# Patient Record
Sex: MALE | Race: WHITE | Employment: OTHER | URBAN - METROPOLITAN AREA
[De-identification: names, ages, dates, MRNs, and addresses within clinical notes are randomized per-mention and may not be internally consistent; named-entity substitution may affect disease eponyms.]

---

## 2018-05-31 ENCOUNTER — TELEPHONE (OUTPATIENT)
Dept: SCHEDULING | Facility: CLINIC | Age: 78
End: 2018-05-31

## 2018-05-31 NOTE — TELEPHONE ENCOUNTER
"New Pt Dr Rogers  PCP Verified  Cardiologist - Dr Juloi Huang - referring to Dr Rogers for 2nd opinion for valve repair surgery  No recent hospitalizations  No assistance needed  Pt's sister stated he \"had all kinds of tests and she will bring paperwork.\"   Insurance - Medicare #[id number]  [employer] #[id number]  Offered 7/10; first available - requesting to be seen sooner.  Pls call Karime to schedule  Thank you   "

## 2018-06-04 NOTE — TELEPHONE ENCOUNTER
Patient spouse was contacted and added to the schedule of Dr. ANDERSON for 06/14. NPP sent in the mail

## 2018-06-11 PROBLEM — Z99.2 END STAGE RENAL DISEASE ON DIALYSIS (CMS/HCC): Status: ACTIVE | Noted: 2018-06-11

## 2018-06-11 PROBLEM — I34.0 MITRAL REGURGITATION: Status: ACTIVE | Noted: 2018-06-11

## 2018-06-11 PROBLEM — E78.5 HYPERLIPIDEMIA: Status: ACTIVE | Noted: 2018-06-11

## 2018-06-11 PROBLEM — N18.6 END STAGE RENAL DISEASE ON DIALYSIS (CMS/HCC): Status: ACTIVE | Noted: 2018-06-11

## 2018-06-11 PROBLEM — Z94.0 HISTORY OF KIDNEY TRANSPLANT: Status: ACTIVE | Noted: 2018-06-11

## 2018-06-11 PROBLEM — G47.33 OSA (OBSTRUCTIVE SLEEP APNEA): Status: ACTIVE | Noted: 2018-06-11

## 2018-06-11 PROBLEM — I10 HTN (HYPERTENSION): Status: ACTIVE | Noted: 2018-06-11

## 2018-06-11 PROBLEM — I25.10 CORONARY ARTERY DISEASE INVOLVING NATIVE CORONARY ARTERY OF NATIVE HEART WITHOUT ANGINA PECTORIS: Status: ACTIVE | Noted: 2018-06-11

## 2018-06-11 PROBLEM — N18.5 CHRONIC RENAL FAILURE, STAGE 5 (CMS/HCC): Status: ACTIVE | Noted: 2018-06-11

## 2018-06-11 PROBLEM — Z99.2: Status: ACTIVE | Noted: 2018-06-11

## 2018-06-11 PROBLEM — Z95.810 BIVENTRICULAR IMPLANTABLE CARDIOVERTER-DEFIBRILLATOR (ICD) IN SITU: Status: ACTIVE | Noted: 2018-06-11

## 2018-06-11 PROBLEM — Z90.5 STATUS POST NEPHRECTOMY: Status: ACTIVE | Noted: 2018-06-11

## 2018-06-11 PROBLEM — Z98.61 HISTORY OF PERCUTANEOUS CORONARY INTERVENTION: Status: ACTIVE | Noted: 2018-06-11

## 2018-06-11 PROBLEM — I42.0 DILATED CARDIOMYOPATHY (CMS/HCC): Status: ACTIVE | Noted: 2018-06-11

## 2018-06-11 PROBLEM — Q61.3 POLYCYSTIC KIDNEY DISEASE: Status: ACTIVE | Noted: 2018-06-11

## 2018-06-11 PROBLEM — I44.7 LBBB (LEFT BUNDLE BRANCH BLOCK): Status: ACTIVE | Noted: 2018-06-11

## 2018-06-11 PROBLEM — Z90.49 HISTORY OF APPENDECTOMY: Status: ACTIVE | Noted: 2018-06-11

## 2018-06-14 ENCOUNTER — OFFICE VISIT (OUTPATIENT)
Dept: CARDIOTHORACIC SURGERY | Facility: CLINIC | Age: 78
End: 2018-06-14
Payer: MEDICARE

## 2018-06-14 VITALS
WEIGHT: 175 LBS | SYSTOLIC BLOOD PRESSURE: 132 MMHG | RESPIRATION RATE: 12 BRPM | HEIGHT: 69 IN | TEMPERATURE: 97.5 F | HEART RATE: 77 BPM | OXYGEN SATURATION: 98 % | DIASTOLIC BLOOD PRESSURE: 66 MMHG | BODY MASS INDEX: 25.92 KG/M2

## 2018-06-14 DIAGNOSIS — I34.0 MITRAL VALVE INSUFFICIENCY, UNSPECIFIED ETIOLOGY: Primary | ICD-10-CM

## 2018-06-14 DIAGNOSIS — I12.0 HYPERTENSIVE CHRONIC KIDNEY DISEASE WITH STAGE 5 CHRONIC KIDNEY DISEASE OR END STAGE RENAL DISEASE (CODE): ICD-10-CM

## 2018-06-14 PROCEDURE — 99214 OFFICE O/P EST MOD 30 MIN: CPT | Performed by: THORACIC SURGERY (CARDIOTHORACIC VASCULAR SURGERY)

## 2018-06-14 PROCEDURE — 200200 PR NO CHARGE: Performed by: INTERNAL MEDICINE

## 2018-06-14 RX ORDER — ASPIRIN 81 MG/1
81 TABLET ORAL DAILY
COMMUNITY

## 2018-06-14 RX ORDER — CARVEDILOL 12.5 MG/1
12.5 TABLET ORAL 2 TIMES DAILY WITH MEALS
COMMUNITY

## 2018-06-14 RX ORDER — MEMANTINE HYDROCHLORIDE 10 MG/1
10 TABLET ORAL 2 TIMES DAILY
COMMUNITY

## 2018-06-14 RX ORDER — MUPIROCIN 20 MG/G
1 OINTMENT TOPICAL 2 TIMES DAILY
Qty: 22 G | Refills: 0 | Status: SHIPPED | OUTPATIENT
Start: 2018-06-14 | End: 2018-06-19

## 2018-06-14 RX ORDER — SEVELAMER CARBONATE 800 MG/1
800 TABLET, FILM COATED ORAL
COMMUNITY

## 2018-06-14 RX ORDER — LOSARTAN POTASSIUM 25 MG/1
25 TABLET ORAL 2 TIMES DAILY
COMMUNITY
End: 2018-07-10 | Stop reason: HOSPADM

## 2018-06-14 RX ORDER — TAMSULOSIN HYDROCHLORIDE 0.4 MG/1
0.4 CAPSULE ORAL NIGHTLY
COMMUNITY
End: 2018-06-14

## 2018-06-14 RX ORDER — ATORVASTATIN CALCIUM 20 MG/1
20 TABLET, FILM COATED ORAL DAILY
COMMUNITY

## 2018-06-14 ASSESSMENT — ENCOUNTER SYMPTOMS
WEAKNESS: 0
FATIGUE: 1
MYALGIAS: 0
CONFUSION: 0
DIARRHEA: 0
WOUND: 0
CHEST TIGHTNESS: 1
RHINORRHEA: 1
DIZZINESS: 1
BRUISES/BLEEDS EASILY: 0
PALPITATIONS: 0
CONSTIPATION: 0
SHORTNESS OF BREATH: 1
LIGHT-HEADEDNESS: 0
AGITATION: 0
BACK PAIN: 1
ARTHRALGIAS: 1
ACTIVITY CHANGE: 1

## 2018-06-14 NOTE — PROGRESS NOTES
Cardiac Surgery    Reason for consultation: Mitral regurgitation    HPI  Patient is a 78 y.o. male seen today for surgical evaluation of his mitral regurgitation. He was evaluated by [facility] and they declined to do surgery. He speaks Senegalese only, he is accompanied by his sister who is assisting with communication. He has symptoms of SOB and dizzy with steps and any activity.   His symptoms are relieved with rest for approximately  2-3 minutes. His symptoms were first noted about a year ago and has been progressive worse since then. He has associated symptoms of edema. He rates his symptoms as moderate.  Pertinent negatives include lightheadedness, syncope, chest pain, palpitations, or fatigue. 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 He has not been hospitalized for CHF in the last year.     Medical History:   Past Medical History:   Diagnosis Date   • Biventricular implantable cardioverter-defibrillator (ICD) in situ 6/11/2018   • Chronic renal failure, stage 5 (CMS/[facility]) ([facility]) 6/11/2018   • Coronary artery disease involving native coronary artery of native heart without angina pectoris 6/11/2018    ALESSIA to LAD   • Dependence on hemodialysis (CMS/[facility]) ([facility]) 6/11/2018   • Dilated cardiomyopathy (CMS/[facility]) ([facility]) 6/11/2018   • End stage renal disease on dialysis (CMS/[facility]) ([facility]) 6/11/2018   • History of appendectomy 6/11/2018    With peritonitis   • History of kidney transplant 6/11/2018   • History of percutaneous coronary intervention 6/11/2018    ALESSIA to LAD   • HTN (hypertension) 6/11/2018   • Hyperlipidemia 6/11/2018   • LBBB (left bundle branch block) 6/11/2018   • Mitral regurgitation 6/11/2018   • LANNY (obstructive sleep apnea) 6/11/2018   • Polycystic kidney disease 6/11/2018   • Status post nephrectomy 6/11/2018    Bilateral       Surgical History:   Past Surgical History:   Procedure Laterality Date   • APPENDECTOMY     • CARDIAC CATHETERIZATION      w stent   • HERNIA  REPAIR     • KIDNEY TRANSPLANT  2010       Allergies: Patient has no known allergies.    Current Outpatient Prescriptions   Medication Sig Dispense Refill   • aspirin 81 mg enteric coated tablet Take 81 mg by mouth daily.     • atorvastatin (LIPITOR) 20 mg tablet Take 20 mg by mouth daily.     • B complex with C#20-folic acid 1 mg capsule Take by mouth daily.     • carvedilol (COREG) 12.5 mg tablet Take 12.5 mg by mouth 2 (two) times a day with meals.     • losartan (COZAAR) 25 mg tablet Take 25 mg by mouth 2 (two) times a day.     • memantine (NAMENDA) 10 mg tablet Take 10 mg by mouth 2 (two) times a day.     • sevelamer (RENVELA) 800 mg tablet Take 800 mg by mouth 3 (three) times a day with meals.     • mupirocin (BACTROBAN) 2 % ointment Apply 1 application topically 2 (two) times a day for 5 days. Nasal application, starting 5 days before surgery 22 g 0     No current facility-administered medications for this visit.        Social History:   Social History     Social History   • Marital status:      Spouse name: N/A   • Number of children: N/A   • Years of education: N/A     Social History Main Topics   • Smoking status: Never Smoker   • Smokeless tobacco: Never Used   • Alcohol use No   • Drug use: No   • Sexual activity: Not Asked     Other Topics Concern   • None     Social History Narrative   • None       Family History:   Family History   Problem Relation Age of Onset   • Heart disease Mother    • Heart attack Mother    • Polycystic kidney disease Mother    • Hypertension Mother    • Stroke Father    • Hypertension Father    • Polycystic kidney disease Sister    • Hypertension Sister    • Polycystic kidney disease Brother    • Hypertension Brother        Review of Systems   Constitutional: Positive for activity change and fatigue.   HENT: Positive for postnasal drip and rhinorrhea. Negative for congestion.    Eyes: Negative for visual disturbance.   Respiratory: Positive for chest tightness and  shortness of breath.         No orthopnea   Cardiovascular: Positive for leg swelling. Negative for chest pain and palpitations.   Gastrointestinal: Negative for constipation and diarrhea.        Gall stones, to be scheduled for endoscopy   Genitourinary:        Anuric   Musculoskeletal: Positive for arthralgias and back pain. Negative for myalgias.   Skin: Negative for rash and wound.   Neurological: Positive for dizziness. Negative for syncope, weakness and light-headedness.   Hematological: Does not bruise/bleed easily.   Psychiatric/Behavioral: Negative for agitation, behavioral problems and confusion.   All other systems reviewed and are negative.      Objective        Physical Exam   Constitutional: He is oriented to person, place, and time. He appears well-developed and well-nourished. No distress.   HENT:   Head: Atraumatic.   Eyes: Conjunctivae and EOM are normal. Pupils are equal, round, and reactive to light.   Neck: Normal range of motion. Neck supple.   Cardiovascular: Normal rate, regular rhythm and normal heart sounds.    Grade 2 murmur   Pulmonary/Chest: Effort normal and breath sounds normal. No respiratory distress. He has no rales.   Abdominal: Soft. Bowel sounds are normal.   Musculoskeletal: Normal range of motion. He exhibits edema.   Neurological: He is alert and oriented to person, place, and time.   Skin: Skin is warm and dry. Capillary refill takes 2 to 3 seconds.   Psychiatric: He has a normal mood and affect. His behavior is normal. Judgment and thought content normal.   Vitals reviewed.    Assessment/Plan   Assessment:  Mitral Insufficiency  Echo and Cath reviewed by Dr Garces. Severe mitral regurgitation. Echo revealed dilated LV with moderate to severe MR, posterior leaflet prolapse, anterior directed wide jet, and  EF 35%. Symptoms not medically managed with diuretics, he is on hemodyalysis.  [facility].  Cardiac Cath revealed patent LAD stent and non obstructive lesions. No chest  discomfort, no syncopal or presyncopal episodes. Patient tolerating medical therapy.  Plan:  STS risk for surgical intervention/repair is 13.8% and 14.7% for replacement.  Recommend a mitral clip based on his surgical risk and frailty.  Will need a CTA chest abdomen and pelvis, carotid ultrasound, dental clearance and preadmission testing prior to surgery. He will need to stop his Losartan 5 days prior to surgery.  I have discussed with the patient and the family the rationale for the procedure as well as possible alternatives.  We discussed potential risks and complications associated with this procedure.  The patient and family understand and agree to proceed.    Renal failure  He has ESRD and receives hemodialysis (MWF).   We may need to adjust dialysis in coordination with his nephrologist.     Asa Garces MD

## 2018-06-18 ENCOUNTER — TELEPHONE (OUTPATIENT)
Dept: SCHEDULING | Facility: CLINIC | Age: 78
End: 2018-06-18

## 2018-06-28 ENCOUNTER — TRANSCRIBE ORDERS (OUTPATIENT)
Dept: RADIOLOGY | Facility: HOSPITAL | Age: 78
End: 2018-06-28

## 2018-06-28 ENCOUNTER — HOSPITAL ENCOUNTER (OUTPATIENT)
Dept: CARDIOLOGY | Facility: HOSPITAL | Age: 78
Discharge: HOME | End: 2018-06-28
Attending: NURSE PRACTITIONER
Payer: MEDICARE

## 2018-06-28 ENCOUNTER — APPOINTMENT (OUTPATIENT)
Dept: PREADMISSION TESTING | Facility: HOSPITAL | Age: 78
End: 2018-06-28
Attending: THORACIC SURGERY (CARDIOTHORACIC VASCULAR SURGERY)
Payer: MEDICARE

## 2018-06-28 ENCOUNTER — HOSPITAL ENCOUNTER (OUTPATIENT)
Dept: RADIOLOGY | Facility: HOSPITAL | Age: 78
Discharge: HOME | End: 2018-06-28
Attending: NURSE PRACTITIONER
Payer: MEDICARE

## 2018-06-28 ENCOUNTER — TELEPHONE (OUTPATIENT)
Dept: SCHEDULING | Facility: CLINIC | Age: 78
End: 2018-06-28

## 2018-06-28 ENCOUNTER — ANESTHESIA EVENT (OUTPATIENT)
Dept: CARDIOLOGY | Facility: HOSPITAL | Age: 78
Setting detail: SURGERY ADMIT
DRG: 228 | End: 2018-06-28
Payer: MEDICARE

## 2018-06-28 ENCOUNTER — APPOINTMENT (OUTPATIENT)
Dept: LAB | Facility: HOSPITAL | Age: 78
End: 2018-06-28
Attending: NURSE PRACTITIONER
Payer: MEDICARE

## 2018-06-28 VITALS
HEART RATE: 82 BPM | DIASTOLIC BLOOD PRESSURE: 70 MMHG | BODY MASS INDEX: 25.17 KG/M2 | WEIGHT: 175.8 LBS | RESPIRATION RATE: 16 BRPM | SYSTOLIC BLOOD PRESSURE: 157 MMHG | TEMPERATURE: 97.2 F | HEIGHT: 70 IN

## 2018-06-28 DIAGNOSIS — I34.0 NONRHEUMATIC MITRAL VALVE INSUFFICIENCY: Primary | ICD-10-CM

## 2018-06-28 DIAGNOSIS — I12.0 HYPERTENSIVE CHRONIC KIDNEY DISEASE WITH STAGE 5 CHRONIC KIDNEY DISEASE OR END STAGE RENAL DISEASE (CODE): ICD-10-CM

## 2018-06-28 DIAGNOSIS — I34.0 MITRAL VALVE INSUFFICIENCY, UNSPECIFIED ETIOLOGY: ICD-10-CM

## 2018-06-28 DIAGNOSIS — I34.0 NONRHEUMATIC MITRAL VALVE INSUFFICIENCY: ICD-10-CM

## 2018-06-28 DIAGNOSIS — Z01.818 ENCOUNTER FOR PREADMISSION TESTING: Primary | ICD-10-CM

## 2018-06-28 LAB
ABO + RH BLD: NORMAL
ALBUMIN SERPL-MCNC: 3.3 G/DL (ref 3.4–5)
ALP SERPL-CCNC: 86 IU/L (ref 35–126)
ALT SERPL-CCNC: 19 IU/L (ref 16–63)
ANION GAP SERPL CALC-SCNC: 13 MEQ/L (ref 3–15)
ANISOCYTOSIS BLD QL SMEAR: ABNORMAL
APTT PPP: 39 SEC. (ref 23–35)
AST SERPL-CCNC: 22 IU/L (ref 15–41)
ATRIAL RATE: 82
BASOPHILS # BLD: 0.03 K/UL (ref 0.01–0.1)
BASOPHILS NFR BLD: 0.8 %
BILIRUB SERPL-MCNC: 1 MG/DL (ref 0.3–1.2)
BLD GP AB SCN SERPL QL: NEGATIVE
BLOOD BANK CMNT PATIENT-IMP: NORMAL
BNP SERPL-MCNC: 3935 PG/ML
BUN SERPL-MCNC: 29 MG/DL (ref 8–20)
CALCIUM SERPL-MCNC: 10 MG/DL (ref 8.9–10.3)
CHLORIDE SERPL-SCNC: 95 MMOL/L (ref 98–109)
CO2 SERPL-SCNC: 30 MMOL/L (ref 22–32)
CREAT SERPL-MCNC: 5 MG/DL (ref 0.8–1.3)
D AG BLD QL: POSITIVE
DIFFERENTIAL METHOD BLD: ABNORMAL
EOSINOPHIL # BLD: 0.25 K/UL (ref 0.04–0.54)
EOSINOPHIL NFR BLD: 6.5 %
ERYTHROCYTE [DISTWIDTH] IN BLOOD BY AUTOMATED COUNT: 16.1 % (ref 11.6–14.4)
GFR SERPL CREATININE-BSD FRML MDRD: 11.3 ML/MIN/1.73M*2
GLUCOSE SERPL-MCNC: 113 MG/DL (ref 70–99)
HCT VFR BLDCO AUTO: 34 % (ref 40–51)
HGB BLD-MCNC: 11.3 G/DL (ref 13.7–17.5)
HYPOCHROMIA BLD QL SMEAR: ABNORMAL
IMM GRANULOCYTES # BLD AUTO: 0.01 K/UL (ref 0–0.08)
IMM GRANULOCYTES NFR BLD AUTO: 0.3 %
INR PPP: 1.2 INR
LABORATORY COMMENT REPORT: NORMAL
LYMPHOCYTES # BLD: 0.75 K/UL (ref 1.2–3.5)
LYMPHOCYTES NFR BLD: 19.4 %
MACROCYTES BLD QL SMEAR: ABNORMAL
MCH RBC QN AUTO: 31.7 PG (ref 28–33.2)
MCHC RBC AUTO-ENTMCNC: 33.2 G/DL (ref 32.2–36.5)
MCV RBC AUTO: 95.2 FL (ref 83–98)
MONOCYTES # BLD: 0.36 K/UL (ref 0.3–1)
MONOCYTES NFR BLD: 9.3 %
NEUTROPHILS # BLD: 2.47 K/UL (ref 1.7–7)
NEUTS SEG NFR BLD: 63.7 %
NRBC BLD-RTO: 0 %
P AXIS: 52
PDW BLD AUTO: 11.3 FL (ref 9.4–12.4)
PLAT MORPH BLD: NORMAL
PLATELET # BLD AUTO: 122 K/UL (ref 150–350)
PLATELET # BLD EST: ABNORMAL 10*3/UL
POLYCHROMASIA BLD QL SMEAR: ABNORMAL
POTASSIUM SERPL-SCNC: 4.6 MMOL/L (ref 3.6–5.1)
PR INTERVAL: 216
PREALB SERPL-MCNC: 24.5 MG/DL (ref 18–38)
PROT SERPL-MCNC: 7.2 G/DL (ref 6–8.2)
PROTHROMBIN TIME: 15.7 SEC. (ref 12.2–14.5)
QRS DURATION: 160
QT INTERVAL: 474
QTC CALCULATION(BAZETT): 553
R AXIS: 111
RBC # BLD AUTO: 3.57 M/UL (ref 4.5–5.8)
SODIUM SERPL-SCNC: 138 MMOL/L (ref 136–144)
T WAVE AXIS: 0
VENTRICULAR RATE: 82
WBC # BLD AUTO: 3.87 K/UL (ref 3.8–10.5)

## 2018-06-28 PROCEDURE — 71275 CT ANGIOGRAPHY CHEST: CPT

## 2018-06-28 PROCEDURE — 83880 ASSAY OF NATRIURETIC PEPTIDE: CPT

## 2018-06-28 PROCEDURE — 63600105 HC IODINE BASED CONTRAST: Performed by: NURSE PRACTITIONER

## 2018-06-28 PROCEDURE — 36415 COLL VENOUS BLD VENIPUNCTURE: CPT

## 2018-06-28 PROCEDURE — 85730 THROMBOPLASTIN TIME PARTIAL: CPT

## 2018-06-28 PROCEDURE — 85025 COMPLETE CBC W/AUTO DIFF WBC: CPT

## 2018-06-28 PROCEDURE — 80053 COMPREHEN METABOLIC PANEL: CPT

## 2018-06-28 PROCEDURE — 93010 ELECTROCARDIOGRAM REPORT: CPT | Performed by: INTERNAL MEDICINE

## 2018-06-28 PROCEDURE — 86901 BLOOD TYPING SEROLOGIC RH(D): CPT

## 2018-06-28 PROCEDURE — 84134 ASSAY OF PREALBUMIN: CPT

## 2018-06-28 PROCEDURE — 85610 PROTHROMBIN TIME: CPT

## 2018-06-28 PROCEDURE — 93880 EXTRACRANIAL BILAT STUDY: CPT

## 2018-06-28 PROCEDURE — 74174 CTA ABD&PLVS W/CONTRAST: CPT

## 2018-06-28 PROCEDURE — 93005 ELECTROCARDIOGRAM TRACING: CPT

## 2018-06-28 RX ADMIN — IOHEXOL 150 ML: 350 INJECTION, SOLUTION INTRAVENOUS at 09:19

## 2018-06-28 ASSESSMENT — ENCOUNTER SYMPTOMS
RESPIRATORY NEGATIVE: 1
CARDIOVASCULAR NEGATIVE: 1
DYSRHYTHMIAS: 1

## 2018-06-28 NOTE — ANESTHESIA PREPROCEDURE EVALUATION
Anesthesia ROS/MED HX    Anesthesia History    Previous anesthetics (kidney transplant)  Pulmonary    Sleep apnea  Neuro/Psych - neg  Cardiovascular   CAD (Stents in place)   hypertension   Dysrhythmias (Bradycardia with defibrillator)  GI/Hepatic- neg  Endo/Other   Chronic renal disease (Dialysis three times per week)      Past Surgical History:   Procedure Laterality Date   • APPENDECTOMY     • AV FISTULA PLACEMENT Left    • CARDIAC CATHETERIZATION      w stent   • CATARACT EXTRACTION W/  INTRAOCULAR LENS IMPLANT     • HERNIA REPAIR     • KIDNEY TRANSPLANT  2010       Physical Exam    Airway   Mallampati: III   TM distance: >3 FB   Neck ROM: full  Pulmonary - normal   clear to auscultation  Dental - normal        Anesthesia Plan    Plan: general    Technique: general endotracheal     Lines and Monitors: arterial line, central line and additional IV   ASA 4  Anesthetic plan and risks discussed with: patient and sibling  Induction:    intravenous   Postop Plan:   Patient Disposition: inpatient floor planned admission   Pain Management: IV analgesics

## 2018-06-28 NOTE — PRE-PROCEDURE INSTRUCTIONS
1. We will call you between 3 pm and 7 pm on July 6, 2018 to determine that arrival time for your procedure. If you do not hear by 4:30 PM. Please call [phone] for arrival time.    2. Please report to [address] in [address] / timi, walk into main lobby and report to the admission desk on first floor on the day of your procedure.   3. Please follow the following fasting guidelines:   Nothing to eat or drink after midnight unless otherwise instructed by  your physician. No gum mints candy. Brush teeth/Rinse Mouth   4. NO MEDICATIONS DAY OF SURGERY  Instructions reviewed with patient and patient's sister  Susan   5. Other Instructions: per Dr Garces   6. If you develop a cold, cough, fever, rash, or other symptom prior to the data of the procedure, please report it to your physician immediately.   7. If you need to cancel the procedure for any reason, please contact your physician or call the unit listed above.   8. Make arrangements to have someone drive you home from the procedure. If you have not arranged for transportation home, your surgery may be cancelled.    9. You may not take public transportation unless accompanied by a responsible person.   10. You may not drive a car or operate complex or potentially dangerous machinery for 24 hours following anesthesia and/or sedation.   11. If it is medically necessary for you to have a longer stay, you will be informed as soon as the decision is made.   12. Do not wear or bring anything of value to the hospital including jewelry of any kind. Do not wear make-up or contact lenses. DO bring your glasses and hearing aid.   13. No lotion, creams, powders, or oils on skin the morning of procedure    14. Dress in comfortable clothes.   15.  If instructed, please bring a copy of your Advanced Directive (Living Will/Durable Power of ) on the day of your procedure.      Pre operative instructions given as per protocol.  Form explained by: SABINA Anthony     I  have read and understand the above information. I have had sufficient opportunity to ask questions I might have and they have been answered to my satisfaction. I agree to comply with the Patient Responsibilities listed above and have received a copy of this form.

## 2018-06-28 NOTE — TELEPHONE ENCOUNTER
Pt of Dr. Garces for Mitral Clip on 7/9. Received critical creatinine level on patient called from [facility] lab. Creat 5.0. Pt has ESRD and is on HD . I did sent text to SABINA Gabriel to advise. Thank you.

## 2018-06-28 NOTE — H&P
"   History and Physical  Pre-admission testing         HISTORY OF PRESENT ILLNESS      Judson Kincaid is an 78 y.o. male with a history of dilated CM who presents with progressive dyspnea and chest pressure on exertion. He also complains of worsening fatigue. His most recent echocardiogram revealed sever mitral regurgitation. He is scheduled  for a mitral clip.    PAST MEDICAL AND SURGICAL HISTORY      Past Medical History:   Diagnosis Date   • Anemia     in past   • Biventricular implantable cardioverter-defibrillator (ICD) in situ 6/11/2018   • Chest pain on exertion     \"pressure\"   • Chronic renal failure, stage 5 (CMS/[facility]) ([facility]) 6/11/2018   • Coronary artery disease involving native coronary artery of native heart without angina pectoris 6/11/2018    ALESSIA to LAD   • Dependence on hemodialysis (CMS/[facility]) ([facility]) 06/11/2018 Mon-Wed-Friday HD   • Dilated cardiomyopathy (CMS/[facility]) ([facility]) 6/11/2018   • End stage renal disease on dialysis (CMS/[facility]) ([facility]) 6/11/2018   • Gallstones    • History of appendectomy 6/11/2018    With peritonitis   • History of kidney transplant 6/11/2018   • History of percutaneous coronary intervention 6/11/2018    ALESSIA to LAD   • HTN (hypertension) 6/11/2018   • Hyperlipidemia 6/11/2018   • LBBB (left bundle branch block) 6/11/2018   • Mitral regurgitation 6/11/2018   • Neck pain    • Neuropathy of both feet    • LANNY (obstructive sleep apnea) 6/11/2018    does not wear CPAP   • Polycystic kidney disease 6/11/2018   • Prostate cancer (CMS/[facility]) ([facility])     s/p brachytherapy   • Renal failure    • Status post nephrectomy 6/11/2018    Bilateral       Past Surgical History:   Procedure Laterality Date   • APPENDECTOMY     • AV FISTULA PLACEMENT Left    • CARDIAC CATHETERIZATION      w stent   • CATARACT EXTRACTION W/  INTRAOCULAR LENS IMPLANT     • HERNIA REPAIR     • KIDNEY TRANSPLANT  2010       PROBLEM LIST     Patient Active Problem List   Diagnosis   • HTN (hypertension)   • Hyperlipidemia   • " Polycystic kidney disease   • Status post nephrectomy   • History of kidney transplant   • Dependence on hemodialysis (CMS/[facility]) ([facility])   • Dilated cardiomyopathy (CMS/[facility]) ([facility])   • Biventricular implantable cardioverter-defibrillator (ICD) in situ   • Coronary artery disease involving native coronary artery of native heart without angina pectoris   • History of percutaneous coronary intervention   • LANNY (obstructive sleep apnea)   • History of appendectomy   • LBBB (left bundle branch block)   • Mitral regurgitation   • End stage renal disease on dialysis (CMS/HCC) ([facility])   • Mitral valve insufficiency   • Chest pain on exertion       MEDICATIONS        Current Outpatient Prescriptions:   •  aspirin 81 mg enteric coated tablet, Take 81 mg by mouth daily., Disp: , Rfl:   •  atorvastatin (LIPITOR) 20 mg tablet, Take 20 mg by mouth daily., Disp: , Rfl:   •  B complex with C#20-folic acid 1 mg capsule, Take by mouth daily., Disp: , Rfl:   •  carvedilol (COREG) 12.5 mg tablet, Take 12.5 mg by mouth 2 (two) times a day with meals., Disp: , Rfl:   •  losartan (COZAAR) 25 mg tablet, Take 25 mg by mouth 2 (two) times a day., Disp: , Rfl:   •  memantine (NAMENDA) 10 mg tablet, Take 10 mg by mouth 2 (two) times a day., Disp: , Rfl:   •  sevelamer (RENVELA) 800 mg tablet, Take 800 mg by mouth 3 (three) times a day with meals., Disp: , Rfl:   No current facility-administered medications for this visit.     ALLERGIES      No Known Allergies    FAMILY HISTORY      Family History   Problem Relation Age of Onset   • Heart disease Mother    • Heart attack Mother    • Polycystic kidney disease Mother    • Hypertension Mother    • Stroke Father    • Hypertension Father    • Polycystic kidney disease Sister    • Hypertension Sister    • Polycystic kidney disease Brother    • Hypertension Brother        SOCIAL HISTORY      Social History     Social History   • Marital status:      Spouse name: N/A   • Number of children: 4   •  "Years of education: N/A     Occupational History   • retired       Social History Main Topics   • Smoking status: Never Smoker   • Smokeless tobacco: Never Used   • Alcohol use No   • Drug use: No   • Sexual activity: Not on file     Other Topics Concern   • Not on file     Social History Narrative    Patient  lives with his wife in a 2 story home       REVIEW OF SYSTEMS      Review of Systems   Respiratory: Negative.    Cardiovascular: Negative.    Neurological:        Numbness feet       PHYSICAL EXAMINATION      BP (!) 157/70 (BP Location: Right upper arm, Patient Position: Sitting)   Pulse 82   Temp 36.2 °C (97.2 °F)   Resp 16 Comment: 97%  Ht 1.778 m (5' 10\")   Wt 79.7 kg (175 lb 12.8 oz)   BMI 25.22 kg/m²   Body mass index is 25.22 kg/m².    Physical Exam   Constitutional: He is oriented to person, place, and time. He appears well-developed and well-nourished.   HENT:   Head: Normocephalic and atraumatic.   Right Ear: External ear normal.   Left Ear: External ear normal.   Mouth/Throat: Oropharynx is clear and moist.   Eyes: Conjunctivae and EOM are normal. Pupils are equal, round, and reactive to light.   Neck: Normal range of motion. Neck supple.   Cardiovascular: S1 normal, S2 normal and intact distal pulses.  An irregular rhythm present.   Murmur heard.   Systolic murmur is present with a grade of 3/6   Pulses:       Carotid pulses are on the right side with bruit, and on the left side with bruit.       Dorsalis pedis pulses are 1+ on the right side, and 1+ on the left side.        Posterior tibial pulses are 1+ on the right side, and 1+ on the left side.   Pulmonary/Chest: Effort normal and breath sounds normal.   Abdominal: Soft. Bowel sounds are normal.   Musculoskeletal: Normal range of motion.   Neurological: He is alert and oriented to person, place, and time.   Skin: Skin is warm.   Psychiatric: He has a normal mood and affect. His behavior is normal. Judgment and thought content normal. "   Nursing note and vitals reviewed.         SABINA Anthony  6/28/2018

## 2018-06-29 DIAGNOSIS — Z95.2 S/P TAVR (TRANSCATHETER AORTIC VALVE REPLACEMENT): Primary | ICD-10-CM

## 2018-07-09 ENCOUNTER — ANESTHESIA (OUTPATIENT)
Dept: CARDIOLOGY | Facility: HOSPITAL | Age: 78
Setting detail: SURGERY ADMIT
DRG: 228 | End: 2018-07-09
Payer: MEDICARE

## 2018-07-09 ENCOUNTER — APPOINTMENT (INPATIENT)
Dept: RADIOLOGY | Facility: HOSPITAL | Age: 78
DRG: 228 | End: 2018-07-09
Attending: PHYSICIAN ASSISTANT
Payer: MEDICARE

## 2018-07-09 ENCOUNTER — APPOINTMENT (INPATIENT)
Dept: CARDIOLOGY | Facility: HOSPITAL | Age: 78
DRG: 228 | End: 2018-07-09
Attending: INTERNAL MEDICINE
Payer: MEDICARE

## 2018-07-09 ENCOUNTER — HOSPITAL ENCOUNTER (INPATIENT)
Facility: HOSPITAL | Age: 78
LOS: 1 days | Discharge: HOME | DRG: 228 | End: 2018-07-10
Attending: THORACIC SURGERY (CARDIOTHORACIC VASCULAR SURGERY) | Admitting: THORACIC SURGERY (CARDIOTHORACIC VASCULAR SURGERY)
Payer: MEDICARE

## 2018-07-09 DIAGNOSIS — I34.0 MITRAL VALVE INSUFFICIENCY, UNSPECIFIED ETIOLOGY: ICD-10-CM

## 2018-07-09 DIAGNOSIS — I34.0 NON-RHEUMATIC MITRAL REGURGITATION: Primary | ICD-10-CM

## 2018-07-09 LAB
ANION GAP SERPL CALC-SCNC: 11 MEQ/L (ref 3–15)
ANION GAP SERPL CALC-SCNC: 12 MEQ/L (ref 3–15)
APTT PPP: 40 SEC. (ref 23–35)
BUN SERPL-MCNC: 44 MG/DL (ref 8–20)
BUN SERPL-MCNC: 49 MG/DL (ref 8–20)
CA-I BLD-SCNC: 1.24 MMOL/L (ref 1.15–1.27)
CALCIUM SERPL-MCNC: 10.2 MG/DL (ref 8.9–10.3)
CALCIUM SERPL-MCNC: 9.6 MG/DL (ref 8.9–10.3)
CHLORIDE SERPL-SCNC: 96 MMOL/L (ref 98–109)
CHLORIDE SERPL-SCNC: 98 MMOL/L (ref 98–109)
CO2 SERPL-SCNC: 25 MMOL/L (ref 22–32)
CO2 SERPL-SCNC: 29 MMOL/L (ref 22–32)
CREAT SERPL-MCNC: 6.7 MG/DL (ref 0.8–1.3)
CREAT SERPL-MCNC: 6.8 MG/DL (ref 0.8–1.3)
ERYTHROCYTE [DISTWIDTH] IN BLOOD BY AUTOMATED COUNT: 16.4 % (ref 11.6–14.4)
ERYTHROCYTE [DISTWIDTH] IN BLOOD BY AUTOMATED COUNT: 16.4 % (ref 11.6–14.4)
GFR SERPL CREATININE-BSD FRML MDRD: 7.9 ML/MIN/1.73M*2
GFR SERPL CREATININE-BSD FRML MDRD: 8 ML/MIN/1.73M*2
GLUCOSE SERPL-MCNC: 152 MG/DL (ref 70–99)
GLUCOSE SERPL-MCNC: 88 MG/DL (ref 70–99)
HCT VFR BLDCO AUTO: 33.2 % (ref 40–51)
HCT VFR BLDCO AUTO: 35.1 % (ref 40–51)
HGB BLD-MCNC: 10.8 G/DL (ref 13.7–17.5)
HGB BLD-MCNC: 11.5 G/DL (ref 13.7–17.5)
INR PPP: 1.3 INR
MAGNESIUM SERPL-MCNC: 2.3 MG/DL (ref 1.8–2.5)
MCH RBC QN AUTO: 31.9 PG (ref 28–33.2)
MCH RBC QN AUTO: 32.1 PG (ref 28–33.2)
MCHC RBC AUTO-ENTMCNC: 32.5 G/DL (ref 32.2–36.5)
MCHC RBC AUTO-ENTMCNC: 32.8 G/DL (ref 32.2–36.5)
MCV RBC AUTO: 97.9 FL (ref 83–98)
MCV RBC AUTO: 98 FL (ref 83–98)
PDW BLD AUTO: 11.8 FL (ref 9.4–12.4)
PDW BLD AUTO: 12.3 FL (ref 9.4–12.4)
PHOSPHATE SERPL-MCNC: 5.3 MG/DL (ref 2.4–4.7)
PLATELET # BLD AUTO: 104 K/UL (ref 150–350)
PLATELET # BLD AUTO: 90 K/UL (ref 150–350)
POTASSIUM SERPL-SCNC: 5 MMOL/L (ref 3.6–5.1)
POTASSIUM SERPL-SCNC: 5 MMOL/L (ref 3.6–5.1)
PROTHROMBIN TIME: 16.1 SEC. (ref 12.2–14.5)
RBC # BLD AUTO: 3.39 M/UL (ref 4.5–5.8)
RBC # BLD AUTO: 3.58 M/UL (ref 4.5–5.8)
SODIUM SERPL-SCNC: 135 MMOL/L (ref 136–144)
SODIUM SERPL-SCNC: 136 MMOL/L (ref 136–144)
STRESS TARGET HR: 121 BPM
WBC # BLD AUTO: 4.77 K/UL (ref 3.8–10.5)
WBC # BLD AUTO: 4.94 K/UL (ref 3.8–10.5)

## 2018-07-09 PROCEDURE — 25800000 HC PHARMACY IV SOLUTIONS: Performed by: THORACIC SURGERY (CARDIOTHORACIC VASCULAR SURGERY)

## 2018-07-09 PROCEDURE — B246ZZ4 ULTRASONOGRAPHY OF RIGHT AND LEFT HEART, TRANSESOPHAGEAL: ICD-10-PCS | Performed by: THORACIC SURGERY (CARDIOTHORACIC VASCULAR SURGERY)

## 2018-07-09 PROCEDURE — 87340 HEPATITIS B SURFACE AG IA: CPT | Performed by: INTERNAL MEDICINE

## 2018-07-09 PROCEDURE — 85027 COMPLETE CBC AUTOMATED: CPT | Performed by: PHYSICIAN ASSISTANT

## 2018-07-09 PROCEDURE — 02HV33Z INSERTION OF INFUSION DEVICE INTO SUPERIOR VENA CAVA, PERCUTANEOUS APPROACH: ICD-10-PCS | Performed by: THORACIC SURGERY (CARDIOTHORACIC VASCULAR SURGERY)

## 2018-07-09 PROCEDURE — 82330 ASSAY OF CALCIUM: CPT | Performed by: PHYSICIAN ASSISTANT

## 2018-07-09 PROCEDURE — 63600000 HC DRUGS/DETAIL CODE: Performed by: NURSE ANESTHETIST, CERTIFIED REGISTERED

## 2018-07-09 PROCEDURE — 93286 PERI-PX EVAL PM/LDLS PM IP: CPT | Mod: 26 | Performed by: INTERNAL MEDICINE

## 2018-07-09 PROCEDURE — 85610 PROTHROMBIN TIME: CPT | Performed by: PHYSICIAN ASSISTANT

## 2018-07-09 PROCEDURE — C1894 INTRO/SHEATH, NON-LASER: HCPCS | Performed by: INTERNAL MEDICINE

## 2018-07-09 PROCEDURE — 83735 ASSAY OF MAGNESIUM: CPT | Performed by: PHYSICIAN ASSISTANT

## 2018-07-09 PROCEDURE — 33418 REPAIR TCAT MITRAL VALVE: CPT | Performed by: INTERNAL MEDICINE

## 2018-07-09 PROCEDURE — 63600105 HC IODINE BASED CONTRAST: Mod: JW | Performed by: INTERNAL MEDICINE

## 2018-07-09 PROCEDURE — 63700000 HC SELF-ADMINISTRABLE DRUG: Performed by: NURSE PRACTITIONER

## 2018-07-09 PROCEDURE — 71045 X-RAY EXAM CHEST 1 VIEW: CPT

## 2018-07-09 PROCEDURE — 21400000 HC ROOM AND CARE CCU/INTERMEDIATE

## 2018-07-09 PROCEDURE — 71000001 HC PACU PHASE 1 INITIAL 30MIN: Performed by: INTERNAL MEDICINE

## 2018-07-09 PROCEDURE — 80048 BASIC METABOLIC PNL TOTAL CA: CPT | Performed by: THORACIC SURGERY (CARDIOTHORACIC VASCULAR SURGERY)

## 2018-07-09 PROCEDURE — 36415 COLL VENOUS BLD VENIPUNCTURE: CPT | Performed by: THORACIC SURGERY (CARDIOTHORACIC VASCULAR SURGERY)

## 2018-07-09 PROCEDURE — C1769 GUIDE WIRE: HCPCS | Performed by: INTERNAL MEDICINE

## 2018-07-09 PROCEDURE — 33418 REPAIR TCAT MITRAL VALVE: CPT | Mod: Q0 | Performed by: THORACIC SURGERY (CARDIOTHORACIC VASCULAR SURGERY)

## 2018-07-09 PROCEDURE — 85347 COAGULATION TIME ACTIVATED: CPT | Performed by: INTERNAL MEDICINE

## 2018-07-09 PROCEDURE — 02UG3JZ SUPPLEMENT MITRAL VALVE WITH SYNTHETIC SUBSTITUTE, PERCUTANEOUS APPROACH: ICD-10-PCS | Performed by: THORACIC SURGERY (CARDIOTHORACIC VASCULAR SURGERY)

## 2018-07-09 PROCEDURE — 27800000 HC SUPPLY/IMPLANTS: Performed by: INTERNAL MEDICINE

## 2018-07-09 PROCEDURE — 80048 BASIC METABOLIC PNL TOTAL CA: CPT | Performed by: PHYSICIAN ASSISTANT

## 2018-07-09 PROCEDURE — 37000001 HC ANESTHESIA GENERAL: Performed by: INTERNAL MEDICINE

## 2018-07-09 PROCEDURE — 33418 REPAIR TCAT MITRAL VALVE: CPT | Mod: 82,Q0 | Performed by: INTERNAL MEDICINE

## 2018-07-09 PROCEDURE — 85730 THROMBOPLASTIN TIME PARTIAL: CPT | Performed by: PHYSICIAN ASSISTANT

## 2018-07-09 PROCEDURE — 84100 ASSAY OF PHOSPHORUS: CPT | Performed by: PHYSICIAN ASSISTANT

## 2018-07-09 PROCEDURE — 85027 COMPLETE CBC AUTOMATED: CPT | Performed by: THORACIC SURGERY (CARDIOTHORACIC VASCULAR SURGERY)

## 2018-07-09 PROCEDURE — 63600000 HC DRUGS/DETAIL CODE: Performed by: ANESTHESIOLOGY

## 2018-07-09 PROCEDURE — 71000011 HC PACU PHASE 1 EA ADDL MIN: Performed by: INTERNAL MEDICINE

## 2018-07-09 PROCEDURE — 25000000 HC PHARMACY GENERAL: Performed by: NURSE ANESTHETIST, CERTIFIED REGISTERED

## 2018-07-09 PROCEDURE — 93355 ECHO TRANSESOPHAGEAL (TEE): CPT | Performed by: INTERNAL MEDICINE

## 2018-07-09 PROCEDURE — C1760 CLOSURE DEV, VASC: HCPCS | Performed by: INTERNAL MEDICINE

## 2018-07-09 PROCEDURE — 93355 ECHO TRANSESOPHAGEAL (TEE): CPT

## 2018-07-09 PROCEDURE — 93005 ELECTROCARDIOGRAM TRACING: CPT | Performed by: PHYSICIAN ASSISTANT

## 2018-07-09 PROCEDURE — 27200000 HC STERILE SUPPLY: Performed by: INTERNAL MEDICINE

## 2018-07-09 PROCEDURE — 63700000 HC SELF-ADMINISTRABLE DRUG: Performed by: INTERNAL MEDICINE

## 2018-07-09 DEVICE — MITRACLIP XTR SYSTEM KIT 1 SGC: Type: IMPLANTABLE DEVICE | Status: FUNCTIONAL

## 2018-07-09 DEVICE — PERCLOSE PROGLIDE™ SUTURE-MEDIATED CLOSURE SYSTEM
Type: IMPLANTABLE DEVICE | Site: GROIN | Status: FUNCTIONAL
Brand: PERCLOSE PROGLIDE™

## 2018-07-09 RX ORDER — ETOMIDATE 2 MG/ML
INJECTION INTRAVENOUS AS NEEDED
Status: DISCONTINUED | OUTPATIENT
Start: 2018-07-09 | End: 2018-07-09 | Stop reason: SURG

## 2018-07-09 RX ORDER — CHLORHEXIDINE GLUCONATE ORAL RINSE 1.2 MG/ML
15 SOLUTION DENTAL ONCE
Status: ACTIVE | OUTPATIENT
Start: 2018-07-09 | End: 2018-07-10

## 2018-07-09 RX ORDER — SEVELAMER CARBONATE 800 MG/1
800 TABLET, FILM COATED ORAL
Status: DISCONTINUED | OUTPATIENT
Start: 2018-07-10 | End: 2018-07-09

## 2018-07-09 RX ORDER — HEPARIN SODIUM 1000 [USP'U]/ML
INJECTION, SOLUTION INTRAVENOUS; SUBCUTANEOUS AS NEEDED
Status: DISCONTINUED | OUTPATIENT
Start: 2018-07-09 | End: 2018-07-09 | Stop reason: SURG

## 2018-07-09 RX ORDER — CLOPIDOGREL BISULFATE 75 MG/1
75 TABLET ORAL DAILY
Status: DISCONTINUED | OUTPATIENT
Start: 2018-07-10 | End: 2018-07-10 | Stop reason: HOSPADM

## 2018-07-09 RX ORDER — CLOPIDOGREL BISULFATE 300 MG/1
300 TABLET, FILM COATED ORAL ONCE
Status: COMPLETED | OUTPATIENT
Start: 2018-07-09 | End: 2018-07-09

## 2018-07-09 RX ORDER — ASPIRIN 325 MG
325 TABLET ORAL ONCE
Status: COMPLETED | OUTPATIENT
Start: 2018-07-09 | End: 2018-07-09

## 2018-07-09 RX ORDER — SEVELAMER CARBONATE 800 MG/1
2400 TABLET, FILM COATED ORAL
Status: DISCONTINUED | OUTPATIENT
Start: 2018-07-09 | End: 2018-07-09

## 2018-07-09 RX ORDER — PROTAMINE SULFATE 10 MG/ML
INJECTION, SOLUTION INTRAVENOUS AS NEEDED
Status: DISCONTINUED | OUTPATIENT
Start: 2018-07-09 | End: 2018-07-09 | Stop reason: SURG

## 2018-07-09 RX ORDER — NEOSTIGMINE METHYLSULFATE 1 MG/ML
INJECTION INTRAVENOUS AS NEEDED
Status: DISCONTINUED | OUTPATIENT
Start: 2018-07-09 | End: 2018-07-09 | Stop reason: SURG

## 2018-07-09 RX ORDER — ONDANSETRON 4 MG/1
4 TABLET, ORALLY DISINTEGRATING ORAL EVERY 8 HOURS PRN
Status: DISCONTINUED | OUTPATIENT
Start: 2018-07-09 | End: 2018-07-10 | Stop reason: HOSPADM

## 2018-07-09 RX ORDER — SODIUM CHLORIDE 9 MG/ML
5 INJECTION, SOLUTION INTRAVENOUS AS NEEDED
Status: DISCONTINUED | OUTPATIENT
Start: 2018-07-09 | End: 2018-07-09

## 2018-07-09 RX ORDER — PHENYLEPHRINE HYDROCHLORIDE 10 MG/ML
INJECTION INTRAVENOUS AS NEEDED
Status: DISCONTINUED | OUTPATIENT
Start: 2018-07-09 | End: 2018-07-09 | Stop reason: SURG

## 2018-07-09 RX ORDER — ONDANSETRON HYDROCHLORIDE 2 MG/ML
INJECTION, SOLUTION INTRAVENOUS AS NEEDED
Status: DISCONTINUED | OUTPATIENT
Start: 2018-07-09 | End: 2018-07-09 | Stop reason: SURG

## 2018-07-09 RX ORDER — GLYCOPYRROLATE 0.6MG/3ML
SYRINGE (ML) INTRAVENOUS AS NEEDED
Status: DISCONTINUED | OUTPATIENT
Start: 2018-07-09 | End: 2018-07-09 | Stop reason: SURG

## 2018-07-09 RX ORDER — IODIXANOL 320 MG/ML
INJECTION, SOLUTION INTRAVASCULAR AS NEEDED
Status: DISCONTINUED | OUTPATIENT
Start: 2018-07-09 | End: 2018-07-09 | Stop reason: HOSPADM

## 2018-07-09 RX ORDER — ATROPINE SULFATE 0.1 MG/ML
0.5 INJECTION INTRAVENOUS EVERY 5 MIN PRN
Status: DISCONTINUED | OUTPATIENT
Start: 2018-07-09 | End: 2018-07-10 | Stop reason: HOSPADM

## 2018-07-09 RX ORDER — SEVELAMER CARBONATE 800 MG/1
2400 TABLET, FILM COATED ORAL
Status: DISCONTINUED | OUTPATIENT
Start: 2018-07-10 | End: 2018-07-10 | Stop reason: HOSPADM

## 2018-07-09 RX ORDER — CEFAZOLIN SODIUM/WATER 1 G/10 ML
2 SYRINGE (ML) INTRAVENOUS
Status: DISCONTINUED | OUTPATIENT
Start: 2018-07-09 | End: 2018-07-10 | Stop reason: HOSPADM

## 2018-07-09 RX ORDER — PROPOFOL 10 MG/ML
INJECTION, EMULSION INTRAVENOUS AS NEEDED
Status: DISCONTINUED | OUTPATIENT
Start: 2018-07-09 | End: 2018-07-09 | Stop reason: SURG

## 2018-07-09 RX ORDER — TAMSULOSIN HYDROCHLORIDE 0.4 MG/1
0.4 CAPSULE ORAL NIGHTLY
COMMUNITY

## 2018-07-09 RX ORDER — MEMANTINE HYDROCHLORIDE 10 MG/1
10 TABLET ORAL 2 TIMES DAILY
Status: DISCONTINUED | OUTPATIENT
Start: 2018-07-09 | End: 2018-07-10 | Stop reason: HOSPADM

## 2018-07-09 RX ORDER — ROCURONIUM BROMIDE 10 MG/ML
INJECTION, SOLUTION INTRAVENOUS AS NEEDED
Status: DISCONTINUED | OUTPATIENT
Start: 2018-07-09 | End: 2018-07-09 | Stop reason: SURG

## 2018-07-09 RX ORDER — ACETAMINOPHEN 325 MG/1
650 TABLET ORAL EVERY 6 HOURS PRN
Status: DISCONTINUED | OUTPATIENT
Start: 2018-07-09 | End: 2018-07-10 | Stop reason: HOSPADM

## 2018-07-09 RX ORDER — NOREPINEPHRINE BITARTRATE 0.02 MG/ML
INJECTION, SOLUTION INTRAVENOUS CONTINUOUS PRN
Status: DISCONTINUED | OUTPATIENT
Start: 2018-07-09 | End: 2018-07-09 | Stop reason: SURG

## 2018-07-09 RX ORDER — ACETAMINOPHEN 325 MG/1
650 TABLET ORAL EVERY 4 HOURS PRN
Status: DISCONTINUED | OUTPATIENT
Start: 2018-07-09 | End: 2018-07-10 | Stop reason: HOSPADM

## 2018-07-09 RX ORDER — ACETAMINOPHEN 325 MG/1
TABLET ORAL
Status: DISPENSED
Start: 2018-07-09 | End: 2018-07-10

## 2018-07-09 RX ORDER — LIDOCAINE HYDROCHLORIDE 10 MG/ML
INJECTION, SOLUTION INFILTRATION; PERINEURAL AS NEEDED
Status: DISCONTINUED | OUTPATIENT
Start: 2018-07-09 | End: 2018-07-09 | Stop reason: SURG

## 2018-07-09 RX ORDER — FENTANYL CITRATE 50 UG/ML
INJECTION, SOLUTION INTRAMUSCULAR; INTRAVENOUS AS NEEDED
Status: DISCONTINUED | OUTPATIENT
Start: 2018-07-09 | End: 2018-07-09 | Stop reason: SURG

## 2018-07-09 RX ORDER — TAMSULOSIN HYDROCHLORIDE 0.4 MG/1
0.4 CAPSULE ORAL NIGHTLY
Status: DISCONTINUED | OUTPATIENT
Start: 2018-07-09 | End: 2018-07-10 | Stop reason: HOSPADM

## 2018-07-09 RX ORDER — ASPIRIN 81 MG/1
81 TABLET ORAL DAILY
Status: DISCONTINUED | OUTPATIENT
Start: 2018-07-09 | End: 2018-07-10

## 2018-07-09 RX ORDER — CARVEDILOL 12.5 MG/1
12.5 TABLET ORAL 2 TIMES DAILY WITH MEALS
Status: DISCONTINUED | OUTPATIENT
Start: 2018-07-10 | End: 2018-07-10 | Stop reason: HOSPADM

## 2018-07-09 RX ORDER — ATORVASTATIN CALCIUM 20 MG/1
20 TABLET, FILM COATED ORAL DAILY
Status: DISCONTINUED | OUTPATIENT
Start: 2018-07-09 | End: 2018-07-10 | Stop reason: HOSPADM

## 2018-07-09 RX ORDER — CEFAZOLIN SODIUM/WATER 1 G/10 ML
SYRINGE (ML) INTRAVENOUS AS NEEDED
Status: DISCONTINUED | OUTPATIENT
Start: 2018-07-09 | End: 2018-07-09 | Stop reason: SURG

## 2018-07-09 RX ORDER — ONDANSETRON HYDROCHLORIDE 2 MG/ML
4 INJECTION, SOLUTION INTRAVENOUS EVERY 8 HOURS PRN
Status: DISCONTINUED | OUTPATIENT
Start: 2018-07-09 | End: 2018-07-10 | Stop reason: HOSPADM

## 2018-07-09 RX ORDER — ASPIRIN 81 MG/1
81 TABLET ORAL DAILY
Status: DISCONTINUED | OUTPATIENT
Start: 2018-07-09 | End: 2018-07-09

## 2018-07-09 RX ORDER — ALUMINUM HYDROXIDE, MAGNESIUM HYDROXIDE, AND SIMETHICONE 1200; 120; 1200 MG/30ML; MG/30ML; MG/30ML
30 SUSPENSION ORAL EVERY 4 HOURS PRN
Status: DISCONTINUED | OUTPATIENT
Start: 2018-07-09 | End: 2018-07-10 | Stop reason: HOSPADM

## 2018-07-09 RX ADMIN — LIDOCAINE HYDROCHLORIDE 5 ML: 10 INJECTION, SOLUTION INFILTRATION; PERINEURAL at 13:23

## 2018-07-09 RX ADMIN — ASPIRIN 325 MG: 325 TABLET ORAL at 16:57

## 2018-07-09 RX ADMIN — FENTANYL CITRATE 100 MCG: 50 INJECTION, SOLUTION INTRAMUSCULAR; INTRAVENOUS at 13:23

## 2018-07-09 RX ADMIN — ONDANSETRON 4 MG: 2 INJECTION INTRAMUSCULAR; INTRAVENOUS at 15:15

## 2018-07-09 RX ADMIN — PHENYLEPHRINE HYDROCHLORIDE 50 MCG: 10 INJECTION INTRAVENOUS at 14:15

## 2018-07-09 RX ADMIN — PROPOFOL 30 MG: 10 INJECTION, EMULSION INTRAVENOUS at 13:23

## 2018-07-09 RX ADMIN — ETOMIDATE 10 MG: 2 INJECTION INTRAVENOUS at 13:23

## 2018-07-09 RX ADMIN — PHENYLEPHRINE HYDROCHLORIDE 50 MCG: 10 INJECTION INTRAVENOUS at 14:10

## 2018-07-09 RX ADMIN — Medication 3 MG: at 15:19

## 2018-07-09 RX ADMIN — CLOPIDOGREL BISULFATE 300 MG: 300 TABLET, FILM COATED ORAL at 16:57

## 2018-07-09 RX ADMIN — Medication 2 G: at 13:30

## 2018-07-09 RX ADMIN — ACETAMINOPHEN 650 MG: 325 TABLET, FILM COATED ORAL at 17:44

## 2018-07-09 RX ADMIN — NOREPINEPHRINE BITARTRATE 2 MCG/MIN: at 14:16

## 2018-07-09 RX ADMIN — SODIUM CHLORIDE: 9 INJECTION, SOLUTION INTRAVENOUS at 13:00

## 2018-07-09 RX ADMIN — GLYCOPYRROLATE 0.4 MG: 0.2 INJECTION INTRAMUSCULAR; INTRAVENOUS at 15:19

## 2018-07-09 RX ADMIN — PROTAMINE SULFATE 40 MG: 10 INJECTION, SOLUTION INTRAVENOUS at 15:31

## 2018-07-09 RX ADMIN — ROCURONIUM BROMIDE 30 MG: 10 INJECTION, SOLUTION INTRAVENOUS at 13:25

## 2018-07-09 RX ADMIN — HEPARIN SODIUM 8000 UNITS: 1000 INJECTION, SOLUTION INTRAVENOUS; SUBCUTANEOUS at 14:23

## 2018-07-09 ASSESSMENT — PAIN SCALES - GENERAL: PAIN_LEVEL: 1

## 2018-07-09 ASSESSMENT — ENCOUNTER SYMPTOMS: DYSRHYTHMIAS: 1

## 2018-07-09 NOTE — POST-PROCEDURE NOTE
L IJ cordis removed in lab by Rn. Hemostasis noted. Dressed with 4x4 gauze and tegaderm. Pt tolerated well.

## 2018-07-09 NOTE — ANESTHESIA PROCEDURE NOTES
Central Venous Line:      A central venous line was placed in the OR for the following indication(s): central venous access and CVP monitoring.    Sterility preparation included the following: provider hand hygiene performed prior to central venous catheter insertion and all 5 sterile barriers used (gloves, gown, cap, mask, large sterile drape) during central venous catheter insertion.      The patient was placed in Trendelenburg position. Left internal jugular vein was prepped.    The site was prepped with Chlorhexidine.  A 8.5 Fr (size), introducer     During the procedure, the following specific steps were taken: target vein identified, needle advanced into vein and blood aspirated and guidewire advanced into vein.  Seldinger technique used        Procedure performed using ultrasound guidance and surface landmarks.    Sterile gel and probe cover used in ultrasound-guided central venous catheter insertion.    Image captured and stored.          Intravenous verification was obtained by ultrasound and venous blood return.      Post insertion care included: all ports aspirated, all ports flushed easily, guidewire removed intact, Biopatch applied, line sutured in place and dressing applied.    During the procedure the patient experienced: patient tolerated procedure well with no complications.        Staffing  Anesthesiologist: ALENA GASPAR  Performed: anesthesiologist

## 2018-07-09 NOTE — H&P (VIEW-ONLY)
Cardiac Surgery    Reason for consultation: Mitral regurgitation    HPI  Patient is a 78 y.o. male seen today for surgical evaluation of his mitral regurgitation. He was evaluated by [facility] and they declined to do surgery. He speaks Turkish only, he is accompanied by his sister who is assisting with communication. He has symptoms of SOB and dizzy with steps and any activity.   His symptoms are relieved with rest for approximately  2-3 minutes. His symptoms were first noted about a year ago and has been progressive worse since then. He has associated symptoms of edema. He rates his symptoms as moderate.  Pertinent negatives include lightheadedness, syncope, chest pain, palpitations, or fatigue. 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 He has not been hospitalized for CHF in the last year.     Medical History:   Past Medical History:   Diagnosis Date   • Biventricular implantable cardioverter-defibrillator (ICD) in situ 6/11/2018   • Chronic renal failure, stage 5 (CMS/[facility]) ([facility]) 6/11/2018   • Coronary artery disease involving native coronary artery of native heart without angina pectoris 6/11/2018    ALESSIA to LAD   • Dependence on hemodialysis (CMS/[facility]) ([facility]) 6/11/2018   • Dilated cardiomyopathy (CMS/[facility]) ([facility]) 6/11/2018   • End stage renal disease on dialysis (CMS/[facility]) ([facility]) 6/11/2018   • History of appendectomy 6/11/2018    With peritonitis   • History of kidney transplant 6/11/2018   • History of percutaneous coronary intervention 6/11/2018    ALESSIA to LAD   • HTN (hypertension) 6/11/2018   • Hyperlipidemia 6/11/2018   • LBBB (left bundle branch block) 6/11/2018   • Mitral regurgitation 6/11/2018   • LANNY (obstructive sleep apnea) 6/11/2018   • Polycystic kidney disease 6/11/2018   • Status post nephrectomy 6/11/2018    Bilateral       Surgical History:   Past Surgical History:   Procedure Laterality Date   • APPENDECTOMY     • CARDIAC CATHETERIZATION      w stent   • HERNIA  REPAIR     • KIDNEY TRANSPLANT  2010       Allergies: Patient has no known allergies.    Current Outpatient Prescriptions   Medication Sig Dispense Refill   • aspirin 81 mg enteric coated tablet Take 81 mg by mouth daily.     • atorvastatin (LIPITOR) 20 mg tablet Take 20 mg by mouth daily.     • B complex with C#20-folic acid 1 mg capsule Take by mouth daily.     • carvedilol (COREG) 12.5 mg tablet Take 12.5 mg by mouth 2 (two) times a day with meals.     • losartan (COZAAR) 25 mg tablet Take 25 mg by mouth 2 (two) times a day.     • memantine (NAMENDA) 10 mg tablet Take 10 mg by mouth 2 (two) times a day.     • sevelamer (RENVELA) 800 mg tablet Take 800 mg by mouth 3 (three) times a day with meals.     • mupirocin (BACTROBAN) 2 % ointment Apply 1 application topically 2 (two) times a day for 5 days. Nasal application, starting 5 days before surgery 22 g 0     No current facility-administered medications for this visit.        Social History:   Social History     Social History   • Marital status:      Spouse name: N/A   • Number of children: N/A   • Years of education: N/A     Social History Main Topics   • Smoking status: Never Smoker   • Smokeless tobacco: Never Used   • Alcohol use No   • Drug use: No   • Sexual activity: Not Asked     Other Topics Concern   • None     Social History Narrative   • None       Family History:   Family History   Problem Relation Age of Onset   • Heart disease Mother    • Heart attack Mother    • Polycystic kidney disease Mother    • Hypertension Mother    • Stroke Father    • Hypertension Father    • Polycystic kidney disease Sister    • Hypertension Sister    • Polycystic kidney disease Brother    • Hypertension Brother        Review of Systems   Constitutional: Positive for activity change and fatigue.   HENT: Positive for postnasal drip and rhinorrhea. Negative for congestion.    Eyes: Negative for visual disturbance.   Respiratory: Positive for chest tightness and  shortness of breath.         No orthopnea   Cardiovascular: Positive for leg swelling. Negative for chest pain and palpitations.   Gastrointestinal: Negative for constipation and diarrhea.        Gall stones, to be scheduled for endoscopy   Genitourinary:        Anuric   Musculoskeletal: Positive for arthralgias and back pain. Negative for myalgias.   Skin: Negative for rash and wound.   Neurological: Positive for dizziness. Negative for syncope, weakness and light-headedness.   Hematological: Does not bruise/bleed easily.   Psychiatric/Behavioral: Negative for agitation, behavioral problems and confusion.   All other systems reviewed and are negative.      Objective        Physical Exam   Constitutional: He is oriented to person, place, and time. He appears well-developed and well-nourished. No distress.   HENT:   Head: Atraumatic.   Eyes: Conjunctivae and EOM are normal. Pupils are equal, round, and reactive to light.   Neck: Normal range of motion. Neck supple.   Cardiovascular: Normal rate, regular rhythm and normal heart sounds.    Grade 2 murmur   Pulmonary/Chest: Effort normal and breath sounds normal. No respiratory distress. He has no rales.   Abdominal: Soft. Bowel sounds are normal.   Musculoskeletal: Normal range of motion. He exhibits edema.   Neurological: He is alert and oriented to person, place, and time.   Skin: Skin is warm and dry. Capillary refill takes 2 to 3 seconds.   Psychiatric: He has a normal mood and affect. His behavior is normal. Judgment and thought content normal.   Vitals reviewed.    Assessment/Plan   Assessment:  Mitral Insufficiency  Echo and Cath reviewed by Dr Garces. Severe mitral regurgitation. Echo revealed dilated LV with moderate to severe MR, posterior leaflet prolapse, anterior directed wide jet, and  EF 35%. Symptoms not medically managed with diuretics, he is on hemodyalysis.  [facility].  Cardiac Cath revealed patent LAD stent and non obstructive lesions. No chest  discomfort, no syncopal or presyncopal episodes. Patient tolerating medical therapy.  Plan:  STS risk for surgical intervention/repair is 13.8% and 14.7% for replacement.  Recommend a mitral clip based on his surgical risk and frailty.  Will need a CTA chest abdomen and pelvis, carotid ultrasound, dental clearance and preadmission testing prior to surgery. He will need to stop his Losartan 5 days prior to surgery.  I have discussed with the patient and the family the rationale for the procedure as well as possible alternatives.  We discussed potential risks and complications associated with this procedure.  The patient and family understand and agree to proceed.    Renal failure  He has ESRD and receives hemodialysis (MWF).   We may need to adjust dialysis in coordination with his nephrologist.     Asa Garces MD

## 2018-07-09 NOTE — Clinical Note
Patient placed on procedure table in supine position with arms at side. Positioning devices: all pressure points padded, arm board under arms, safety strap across legs and donut foam under head.

## 2018-07-09 NOTE — ANESTHESIA PROCEDURE NOTES
Airway  Urgency: elective    Start Time: 7/9/2018 1:30 PM  Airway not difficult    General Information and Staff    Patient location during procedure: OR    Indications and Patient Condition  Indications for airway management: anesthesia  Sedation level: general  Preoxygenated: yes  Patient position: sniffing  MILS maintained throughout  Mask difficulty assessment: 2 - vent by mask + OA or adjuvant +/- NMBA    Final Airway Details  Final airway type: endotracheal airway      Successful airway: ETT wEVAC  Cuffed: yes   Successful intubation technique: video laryngoscopy  Facilitating devices/methods: intubating stylet  Endotracheal tube insertion site: oral  Blade: Melissa  Blade size: #3  ETT size: 7.5 mm  Cormack-Lehane Classification: grade I - full view of glottis  Placement verified by: chest auscultation and capnometry   Measured from: lips  ETT to lips (cm): 21  Number of attempts at approach: 1  Atraumatic airway insertion

## 2018-07-09 NOTE — NURSING NOTE
L neck covered with guaze and tegaderm after removal of  IJ line. L neck swollen and patient c/o pain at site. Dressing has scant amount of bloody drainage. Neck currently soft but very tender.  Team notified. No new orders at this time. Tylenol given for pain.

## 2018-07-09 NOTE — ANESTHESIA PROCEDURE NOTES
Arterial Line:    Start time: 7/9/2018 1:17 PM    An arterial line was placed in the OR for the following indication(s): continuous blood pressure monitoring and blood sampling needed.    A 20 G (size), 1 and 3/4 inch (length), Angiocath (type) catheter was placed,   Seldinger technique used  , into the Right radial artery, secured by Tegaderm.      Procedure performed using surface landmarks.        Events:  patient tolerated procedure well with no complications.    The supervising anesthesiologist was   Attending: ALENA GASPAR

## 2018-07-09 NOTE — Clinical Note
Transseptal puncture completed with standard needle. Performed by: XIN MAXWELL. Dr. Mccormick supervising

## 2018-07-09 NOTE — PLAN OF CARE
Problem: Patient Care Overview  Goal: Plan of Care Review  Outcome: Ongoing (interventions implemented as appropriate)   07/09/18 1621   Coping/Psychosocial   Plan Of Care Reviewed With patient   Plan of Care Review   Progress improving     Goal: Individualization & Mutuality  Outcome: Ongoing (interventions implemented as appropriate)    Goal: Discharge Needs Assessment  Outcome: Ongoing (interventions implemented as appropriate)    Goal: Interprofessional Rounds/Family Conf  Outcome: Ongoing (interventions implemented as appropriate)      Problem: Fall Risk (Adult)  Goal: Identify Related Risk Factors and Signs and Symptoms  Outcome: Ongoing (interventions implemented as appropriate)    Goal: Absence of Falls  Outcome: Ongoing (interventions implemented as appropriate)

## 2018-07-09 NOTE — OR SURGEON
Pre-Procedure patient identification:  I am the primary operating surgeon/proceduralist and I have identified the patient on 07/09/18 at 11:38 AM Asa Garces MD  Phone Number: [phone]

## 2018-07-09 NOTE — Clinical Note
Closure device placed for the right femoral vein. Closure device used: Perclose. Along with purse string suture.

## 2018-07-09 NOTE — POST-PROCEDURE NOTE
Pt transferred to holding area, aaox3. Monitors established. Anesthesia Nu at bedside to administer half dose protamine per MD Mcdonald order.

## 2018-07-09 NOTE — PROGRESS NOTES
Pre-Mitral Clip BiV ICD interrogation/repgrogramming:    [employer] Cognis BiVentricular ICD  Implanted April 2010 by Dr. Barr for ICM/LBBB, continues to follow with [facility] and on remote monitoring  Last office visit/device check May 2018.  DDDR 60-130bpm   VT/VF Zone:   VT1 > 180 bpm, initial duration for detection 60 sec--> ATP x 3, Shock x5  VT2 > 200 bpm, initial duration 12 sec --> ATP x2, Shock x 6  VF >250 bpm, initial duration 2.5 sec --> Quick Convert ATP, shock x 8  Battery: Ok, ~2.5 years remaining, charge time 11.4 seconds (5/2/18)  Presenting Rhythm:  Sinus rhythm with BiV pacing  Underlying Rhythm: Sinus rhythm  % Paced:  A paced 1%, BiV paced 98%  Events: No events recorded over the past 3 months (no recent sustained VT >180bpm or VF).  Leads:                     RA                                RV                            LV                               Shock  Impedance         529 ohms                        655 ohms              736 ohms                        42 ohms  Sensing            4.2mV                                 7.7mV                 14.1mV  Threshold        0.7V@0.5ms                      1.1V@0.5ms        0.8@0.5ms                                                                  (likely chronic as                                                                   permanent output                                                                   at 2.5V@0.5ms)  Summary:  Normal BiV ICD function, case discussed with Dr. Judge (anesthesiologist), for Mitral Clip rec magnet application only PRN.  Continue oupt f/u with EP at [facility].

## 2018-07-09 NOTE — Clinical Note
Transseptal catheter tip was advanced through the sheath and used to assist with transseptal access.

## 2018-07-09 NOTE — ANESTHESIA PREPROCEDURE EVALUATION
Anesthesia ROS/MED HX      Pulmonary    Sleep apnea  Cardiovascular   CAD   Cardiac stents   Pacemaker (dotHIV)   hypertension   Dysrhythmias   Cardiomyopathy or myocarditis (EF 35%)  Endo/Other   Chronic renal disease (ESRD on HD (M/W/F, last HD on Saturday 7/7))  Pediatric Considerations    Murmur      Past Surgical History:   Procedure Laterality Date   • APPENDECTOMY     • AV FISTULA PLACEMENT Left    • CARDIAC CATHETERIZATION      w stent   • CATARACT EXTRACTION W/  INTRAOCULAR LENS IMPLANT     • HERNIA REPAIR     • KIDNEY TRANSPLANT  2010       Physical Exam    Airway   Mallampati: II   TM distance: >3 FB   Neck ROM: full  Cardiovascular    Murmur  Pulmonary - normal   clear to auscultation  Dental    Teeth Problems: upper dentures and missing        Anesthesia Plan    Plan: general    Technique: general endotracheal     Lines and Monitors: arterial line, BIS, central line, cerebral oximetry and JAMAR     Airway: oral intubation and video laryngoscope   ASA 4  Blood Products:     Use of Blood Products Discussed: Yes     Consented to blood products  Anesthetic plan and risks discussed with: patient  Induction:    intravenous   Postop Plan:   Patient Disposition: inpatient floor planned admission   Pain Management: IV analgesics

## 2018-07-09 NOTE — BRIEF OP NOTE
Percutaneous mitral valve repair - additional Procedure Note    Procedure:    Percutaneous mitral valve repair - additional  CPT(R) Code:  95220 - ID TCAT MITRAL VALVE REPAIR ADDL PROSTHESIS    Procedure:    Percutaneous mitral valve repair - initial  CPT(R) Code:  22045 - ID TCAT MITRAL VALVE REPAIR INITIAL PROSTHESIS      Pre-op Diagnosis     * Mitral valve insufficiency, unspecified etiology [I34.0]       Post-op Diagnosis     * Mitral valve insufficiency, unspecified etiology [I34.0]    Surgeon(s) and Role:  Panel 1:     * Caio Mcdonald DO - Primary     * Francis Braun MD - Fellow     * Charan Mccormick MD - Assisting    Panel 2:     * Asa Garces MD - Primary     * Jim Rogers MD - Assisting    Anesthesia: General    Staff:   Circulator: Ousmane Cardona RN; Brynn Foreman RN  Scrub Person: Maria Eugenia Mariee  Documenter: Nay Mckeon RN-RCIS    Procedure Details   Mitraclip x 1. Patient tolerated procedure well    Estimated Blood Loss: No blood loss documented.    Specimens:                  Order Name Source Comment Collection Info Order Time   TYPE AND SCREEN Blood, Venous   7/9/2018 12:13 PM   PREPARE RBC    7/9/2018 12:13 PM    Transfusion indications  Pre-OP major surgery with bleeding risk       Has consent been obtained?  Yes            Drains:      Implants:   Implant Name Type Inv. Item Serial No.  Lot No. LRB No. Used   DEVICE CLOSURE PERCLOSE-PROGLIDE - [id number] Device Vascular Closure DEVICE CLOSURE PERCLOSE-PROGLIDE  [facility]  Right 1   Mitral Clip       [facility] VASCULAR [id number] N/A 1              Complications:  None; patient tolerated the procedure well.           Disposition: PACU - hemodynamically stable.           Condition: stable    Caio Mcdonald DO  Phone Number: [phone]

## 2018-07-09 NOTE — PROGRESS NOTES
"Nephrology Consult Note    Subjective     Judson Kincaid is a 78 y.o. male who was admitted for Mitral valve insufficiency, unspecified etiology [I34.0]  Non-rheumatic mitral regurgitation [I34.0]  Mitral regurgitation [I34.0].     He has a past medical history notable for ESRD from polycystic kidney disease on HD MWF at [facility] dialysis unit with Dr. Ro using a left arm arteriovenous fistula after a prior  donor kidney transplant in 2010 that failed with a history of BK nephropathy and diuresis for CHF and ascites requiring hemodialysis to be resumed in . He also has a history of HTN, hyperlipidemia, CAD s/p LAD stenting, CHF s/p BV ppm in 2010. He follows with Dr. Huang with a history of progressive dyspnea with activity attributed to severe mitral valve regurgitation on echocardiogram. He was admitted to [facility] today for elective mitral clip procedure. He had his last dialysis on 18 instead of Friday in preparation for his procedure today. He is anuric.  Nephrology is consulted for ESRD on HD.     Outside records reviewed.    Medical History:   Past Medical History:   Diagnosis Date   • Anemia     in past   • Biventricular implantable cardioverter-defibrillator (ICD) in situ 2018   • Chest pain on exertion     \"pressure\"   • Chronic renal failure, stage 5 (CMS/[facility]) ([facility]) 2018   • Coronary artery disease involving native coronary artery of native heart without angina pectoris 2018    ALESSIA to LAD   • Dependence on hemodialysis (CMS/[facility]) ([facility]) 2018-Wed-Friday HD   • Dilated cardiomyopathy (CMS/[facility]) ([facility]) 2018   • End stage renal disease on dialysis (CMS/[facility]) ([facility]) 2018   • Gallstones    • History of appendectomy 2018    With peritonitis   • History of kidney transplant 2018   • History of percutaneous coronary intervention 2018    ALESSIA to LAD   • HTN (hypertension) 2018   • Hyperlipidemia 2018 "   • LBBB (left bundle branch block) 6/11/2018   • Mitral regurgitation 6/11/2018   • Neck pain    • Neuropathy of both feet    • LANNY (obstructive sleep apnea) 6/11/2018    does not wear CPAP   • Polycystic kidney disease 6/11/2018   • Prostate cancer (CMS/HCC) (HCC)     s/p brachytherapy   • Renal failure    • Status post nephrectomy 6/11/2018    Bilateral       Surgical History:   Past Surgical History:   Procedure Laterality Date   • APPENDECTOMY     • AV FISTULA PLACEMENT Left    • CARDIAC CATHETERIZATION      w stent   • CATARACT EXTRACTION W/  INTRAOCULAR LENS IMPLANT     • HERNIA REPAIR     • KIDNEY TRANSPLANT  2010       Social History:   Social History     Social History   • Marital status:      Spouse name: N/A   • Number of children: 4   • Years of education: N/A     Occupational History   • retired       Social History Main Topics   • Smoking status: Never Smoker   • Smokeless tobacco: Never Used   • Alcohol use No   • Drug use: No   • Sexual activity: Not Asked     Other Topics Concern   • None     Social History Narrative    Patient  lives with his wife in a 2 story home       Family History:   Family History   Problem Relation Age of Onset   • Heart disease Mother    • Heart attack Mother    • Polycystic kidney disease Mother    • Hypertension Mother    • Stroke Father    • Hypertension Father    • Polycystic kidney disease Sister    • Hypertension Sister    • Polycystic kidney disease Brother    • Hypertension Brother        Allergies: Patient has no known allergies.    Vitals:    07/09/18 1700   BP: 132/61   Pulse: 66   Resp: (!) 4   Temp:    SpO2: 92%       [unfilled]    Review of Systems  A complete review of systems was performed and aside from as mentioned above, was otherwise negative.      Objective   Labs   Recent Results (from the past 24 hour(s))   Basic metabolic panel    Collection Time: 07/09/18 11:31 AM   Result Value Ref Range    Sodium 136 136 - 144 mmol/L    Potassium 5.0 3.6 -  5.1 mmol/L    Chloride 96 (L) 98 - 109 mmol/L    CO2 29 22 - 32 mmol/L    BUN 44 (H) 8 - 20 mg/dL    Creatinine 6.8 (HH) 0.8 - 1.3 mg/dL    Glucose 88 70 - 99 mg/dL    Calcium 10.2 8.9 - 10.3 mg/dL    eGFR 7.9 (L) >=60.0 mL/min/1.73m*2    Anion Gap 11 3 - 15 mEQ/L   CBC    Collection Time: 07/09/18 11:31 AM   Result Value Ref Range    WBC 4.94 3.80 - 10.50 K/uL    RBC 3.58 (L) 4.50 - 5.80 M/uL    Hemoglobin 11.5 (L) 13.7 - 17.5 g/dL    Hematocrit 35.1 (L) 40.0 - 51.0 %    MCV 98.0 83.0 - 98.0 fL    MCH 32.1 28.0 - 33.2 pg    MCHC 32.8 32.2 - 36.5 g/dL    RDW 16.4 (H) 11.6 - 14.4 %    Platelets 104 (L) 150 - 350 K/uL    MPV 12.3 9.4 - 12.4 fL   Prepare RBC: 2 Units Transfusion indications: Pre-OP major surgery with bleeding risk    Collection Time: 07/09/18 12:13 PM   Result Value Ref Range    Product Code C0924A51     Unit ID [id number]     Unit ABO O     Unit RH Negative     Crossmatch Compatible     Product Status XM     Crossmatch Expiration [id number]     ISBT Code 9500     Product Code N9681N42     Unit ID [id number]     Unit ABO O     Unit RH Negative     Crossmatch Compatible     Product Status XM     Crossmatch Expiration [id number]     ISBT Code 9500    Structural heart JAMAR    Collection Time: 07/09/18  4:09 PM   Result Value Ref Range    Target  bpm     I have reviewed the pertinent patient's labs.    Imaging  I have reviewed the pertinent patient's imaging results for this admission.     Physical Exam  /61   Pulse 66   Temp 36.4 °C (97.6 °F) (Oral)   Resp 16   SpO2 95%     General Appearance:  Alert, no distress, appears stated age   Head:  Normocephalic, without obvious abnormality, atraumatic   Eyes:  Conjunctiva/corneas clear, EOM's intact       Ears:  No external abnormalities   Throat: Lips, mucosa normal, no obvious lesions   Neck: Supple, symmetrical, no adenopathy   Back:   Symmetric, no CVA tenderness   Lungs:   Clear to auscultation bilaterally, respirations  unlabored   Heart:  Regular rate and rhythm, S1 and S2 normal, II/VI ERIK   Abdomen:   Soft, non-tender, bowel sounds active, no masses   Genitourinary:  Bladder non-palpable       Extremities: Extremities normal, atraumatic, no edema, left arm AVF + thrill/bruit   Pulses: 2+ and symmetric all extremities   Skin: Skin color, texture, turgor normal, no rashes or lesions       Neurologic:  Behavior/  Emotional: Oriented x 3  Appropriate, cooperative           Assessment   78 y.o. male being consulted for management recommendations       Plan     1. ESRD on HD MWF -   He completed HD Mon,Wed and Sat last week in preparation for mitral valve procedure today.   He has a functional left UE AVF.  Avoid BP/IVs in his left arm.   Pre-procedure potassium level was 5.0 today.  Will plan for abbreviated HD today for 2 hours.  Continue a Nephrocap vitamin daily.  Continue a 2 gm dietary potassium restriction with diet orders.   Follow BMP with HD.     2. Severe MR - EF 40% -   - s/p mitral clip procedure today  - 2 gm dietary sodium restriction with 1 liter per day fluid restriction.  - per CT surgery.    3. HTN -   BP is presently controlled.  Maintained on carvedilol 12.5 mg BID and losartan 25 mg twice a day.     4. Anemia of ESRD -  Target hemoglobin in 10-11 g/dl range.  Hgb is 11.5 g/dl.  No present need for PELON as an inpatient.     5. Hyperlipidemia   - Maintained on atorvastatin.      6. Dilated cardiomyopathy with history of congestive heart failure -  He is status post a biventricular pacemaker in April 2010.  Has undergone angioplasty and stenting of his left anterior descending coronary artery, he follows with Dr. Huang from Cardiology.     7. Secondary hyperparathyroidism of ESRD -   Continue Renvela 800 mg - three tablets with meals.  Hold while NPO.      8. History of prostate cancer -  On tamsulosin.   Followed by Urology.         Ivan Tsai DO

## 2018-07-09 NOTE — Clinical Note
The bilateral groins was clipped  and prepped with ChloraPrep. The patient was draped in a sterile fashion after allowing for the recommended dry time.

## 2018-07-09 NOTE — PROGRESS NOTES
Cardiology Daily Progress Note    Subjective   Judson Kincaid is a 78 y.o. male who was admitted for Mitral valve insufficiency, unspecified etiology [I34.0]  Non-rheumatic mitral regurgitation [I34.0].    Interval History: complains of SAUL, Patient seen in valve clinic with Dr. Garces in heart Team clinic for Severe mitral Regurigtation and reduced LV funciton.     Objective     Vital signs in last 24 hours:  Temp:  [36.4 °C (97.6 °F)] 36.4 °C (97.6 °F)    No intake or output data in the 24 hours ending 07/09/18 1354  Intake/Output this shift:  No intake/output data recorded.    Labs  Lab Results   Component Value Date    WBC 4.94 07/09/2018    HGB 11.5 (L) 07/09/2018    HCT 35.1 (L) 07/09/2018     (L) 07/09/2018    ALT 19 06/28/2018    AST 22 06/28/2018     07/09/2018    K 5.0 07/09/2018    CL 96 (L) 07/09/2018    CREATININE 6.8 (HH) 07/09/2018    BUN 44 (H) 07/09/2018    CO2 29 07/09/2018    INR 1.2 06/28/2018     Troponin I Results     No lab values to display.          Imaging  I have independently reviewed the pertinent imaging from the last 24 hrs.    ECG   Not applicable.    Telemetry  NA      Physical Exam:  General Appearance:  Alert, no distress   Head:  Normocephalic, without obvious abnormality, atraumatic   Eyes:  Conjunctiva/corneas clear, EOM's intact   Neck: No thyroid enlargement. No JVD. No bruits    Lungs:   Decreased breath sounds at the bases   Heart:  s1 s2,Systolic ejection murmur   Abdomen:   Soft, non-tender, no masses, no organomegaly   Vascular: Pulses 2+ and symmetric all extremities, no carotid bruit or jugular vein distention   Musculoskeletal:  Skin: No injury or deformity  Skin color, texture, turgor normal, no rashes or lesions, no cyanosis    Extremities: no clubbing   Behavior/Emotional: Appropriate, cooperative   Neurologic:                          Awake, Alert and Oriented x3,No focal deficit      Assessment/Plan   No new Assessment & Plan notes have been filed  under this hospital service since the last note was generated.  Service: Cardiology    Studies Reviewed with team.  MR is mixed, functional and overriding segments of posterior and anterior leaflets in along the coaptation zone.  Agree with MitraClip as a first line therapy given co morbidities including HD most concerning           Caio Mcdonald, DO  7/9/2018

## 2018-07-09 NOTE — OP NOTE
"PRIMARY SURGEON:  Asa Garces MD  PRIMARY CARDIOLOGIST:Caio Mcdonald DO  ASSISTANTS: Charan Mccormick MD  TRANS-SEPTAL PUNCTURE: Francis Braun MD       Mitral valve and CT registry #[id number]  Procedure date: 7/9/18           PREOPERATIVE DIAGNOSES:  Past Medical History:   Diagnosis Date   • Anemia     in past   • Biventricular implantable cardioverter-defibrillator (ICD) in situ 6/11/2018   • Chest pain on exertion     \"pressure\"   • Chronic renal failure, stage 5 (CMS/[facility]) ([facility]) 6/11/2018   • Coronary artery disease involving native coronary artery of native heart without angina pectoris 6/11/2018    ALESSIA to LAD   • Dependence on hemodialysis (CMS/[facility]) ([facility]) 06/11/2018 Mon-Wed-Friday HD   • Dilated cardiomyopathy (CMS/[facility]) ([facility]) 6/11/2018   • End stage renal disease on dialysis (CMS/[facility]) ([facility]) 6/11/2018   • Gallstones    • History of appendectomy 6/11/2018    With peritonitis   • History of kidney transplant 6/11/2018   • History of percutaneous coronary intervention 6/11/2018    ALESSIA to LAD   • HTN (hypertension) 6/11/2018   • Hyperlipidemia 6/11/2018   • LBBB (left bundle branch block) 6/11/2018   • Mitral regurgitation 6/11/2018   • Neck pain    • Neuropathy of both feet    • LANNY (obstructive sleep apnea) 6/11/2018    does not wear CPAP   • Polycystic kidney disease 6/11/2018   • Prostate cancer (CMS/[facility]) ([facility])     s/p brachytherapy   • Renal failure    • Status post nephrectomy 6/11/2018    Bilateral        POSTOPERATIVE DIAGNOSES:  Past Medical History:   Diagnosis Date   • Anemia     in past   • Biventricular implantable cardioverter-defibrillator (ICD) in situ 6/11/2018   • Chest pain on exertion     \"pressure\"   • Chronic renal failure, stage 5 (CMS/H[facility]) ([facility]) 6/11/2018   • Coronary artery disease involving native coronary artery of native heart without angina pectoris 6/11/2018    ALESSIA to LAD   • Dependence on hemodialysis (CMS/[facility]) ([facility]) 06/11/2018 Mon-Wed-Friday HD   • Dilated cardiomyopathy " (CMS/[facility]) ([facility]) 6/11/2018   • End stage renal disease on dialysis (CMS/HCC) ([facility]) 6/11/2018   • Gallstones    • History of appendectomy 6/11/2018    With peritonitis   • History of kidney transplant 6/11/2018   • History of percutaneous coronary intervention 6/11/2018    ALESSIA to LAD   • HTN (hypertension) 6/11/2018   • Hyperlipidemia 6/11/2018   • LBBB (left bundle branch block) 6/11/2018   • Mitral regurgitation 6/11/2018   • Neck pain    • Neuropathy of both feet    • LANNY (obstructive sleep apnea) 6/11/2018    does not wear CPAP   • Polycystic kidney disease 6/11/2018   • Prostate cancer (CMS/[facility]) ([facility])     s/p brachytherapy   • Renal failure    • Status post nephrectomy 6/11/2018    Bilateral           PROCEDURE PERFORMED:  1. Ultrasound guided right femoral venous access  2. Transseptal puncture  3. Placement of mitral clip XTr device X 1     Estimated blood loss: minimal     Specimens: none        OPERATIVE INDICATIONS: The patient is an 78 y.o. male presented with progressive shortness of breath.. He underwent an echocardiogram which revealed severe mitral regurgitation with eccentric jet originating from medial P2 area. His LVEF was estimated to be %60. After multidisciplinary review of the clinical history, STS risk score, physical examination and diagnostic studies this patient meets criteria for mitraclip placement. The risks and benefits of the procedure including but not limited to bleeding, infection, myocardial infarction, stroke, death, renal and pulmonary insufficiency, as well as the possibility of blood transfusion, future revascularization procedures, cardiac wire perforations, rhythm disturbances requiring pacemaker placement were all discussed with the patient and the patient expressed understanding. All questions were answered. The patient elected to proceed with the above proposed procedure.      PROCEDURE:  Following written consent the the patient was brought into the operating room, placed  supine on the operating table. General anesthesia including endotracheal intubation was administered by the anesthesiologist with no known complications. Time-out completed in the room including patient identification, procedure and surgical site. The patient was prepped and draped in the usual standard surgical fashion. The right femoral vein was approached using ultrasonic guidance.  A 8 Bahamian sheath was then placed and through it an extra stiff Amplatz wire was placed into the superior vena cava. Heparin was administered for a target ACT of over 300. The vein was dilated to accommodate a 14 Bahamian Cook sheath.  Through the Cook sheath a Regino  catheter was placed over 0.032 J-wire into the superior vena cava.  The wire was replaced with a Winburne needle. The catheter was withdrawn under fluoroscopy and JAMAR guidance to an area of the septum which was both posterior and high enough above the mitral valve. The septum was punctured using an energy device as the system was placed across the atrial septum.  A Toray wire was advanced into the left atrium. Extra heparin was administered and ACT was checked and confirmed over 300.  Over the Tory wire the steerable guide was placed into the left atrium.  The clip was prepared examined and found to be adequate. The clip was placed through the guide and using a series of maneuvers it was placed above the mitral valve in the area of greatest regurgitation and flail. The clip arms were open the clip was oriented and placed across the mitral valve.  The clip was withdrawn to the level of the leaflets. The Leaflets were captured, the grippers were dropped the clip was closed.  The valve was inspected by transesophageal echo.  The clip was found to be in a good position with adequate reduction of mitral regurgitation. There was found to be significant reduction of mitral regurgitation with an acceptable gradient (3mmHg) and increase in BP  The clip was released successfully.    The device was removed to the sheath and the sheath was removed from the femoral vein. The femoral vein was closed by 0 silk pursestring was placed. Good hemostasis was noted. The patient was awakened in the room and transported to the post anesthesia recovery area in a stable condition. At the end of the procedure all counts were correct.      Asa Garces MD

## 2018-07-09 NOTE — ANESTHESIA POSTPROCEDURE EVALUATION
Patient: Judson Kincaid    Procedure Summary     Date:  07/09/18 Room / Location:  [facility] CATH LAB 6 / [facility] CATH/EP    Anesthesia Start:  1300 Anesthesia Stop:  1551    Procedures:       Percutaneous mitral valve repair - additional (N/A )      Percutaneous mitral valve repair - initial (N/A ) Diagnosis:       Mitral valve insufficiency, unspecified etiology      (mitral regurgitation)    Provider:  Caio Mcdonald DO; Asa Garces MD Responsible Provider:  Des Judge MD    Anesthesia Type:  general ASA Status:  4          Anesthesia Type: general  PACU Vitals     No data found.        Full report given to Cath Lab nurse. AVSS.    Anesthesia Post Evaluation    Pain score: 1  Pain management: adequate  Mode of pain management: IV medication  Patient location during evaluation: PACU  Patient participation: complete - patient participated  Level of consciousness: awake and alert  Cardiovascular status: acceptable  Airway Patency: adequate  Respiratory status: acceptable  Hydration status: acceptable  Anesthetic complications: no

## 2018-07-10 ENCOUNTER — APPOINTMENT (INPATIENT)
Dept: CARDIOLOGY | Facility: HOSPITAL | Age: 78
DRG: 228 | End: 2018-07-10
Attending: NURSE PRACTITIONER
Payer: MEDICARE

## 2018-07-10 VITALS
BODY MASS INDEX: 25.48 KG/M2 | DIASTOLIC BLOOD PRESSURE: 65 MMHG | OXYGEN SATURATION: 94 % | HEART RATE: 75 BPM | TEMPERATURE: 98.3 F | WEIGHT: 178 LBS | HEIGHT: 70 IN | RESPIRATION RATE: 20 BRPM | SYSTOLIC BLOOD PRESSURE: 142 MMHG

## 2018-07-10 PROBLEM — I34.0 MITRAL REGURGITATION: Status: RESOLVED | Noted: 2018-06-11 | Resolved: 2018-07-10

## 2018-07-10 PROBLEM — I50.42 CHF (CONGESTIVE HEART FAILURE), NYHA CLASS III, CHRONIC, COMBINED (CMS/HCC): Status: ACTIVE | Noted: 2018-07-10

## 2018-07-10 LAB
ANION GAP SERPL CALC-SCNC: 10 MEQ/L (ref 3–15)
AORTIC ROOT ANNULUS - M-MODE: 3.61 CM
AV PEAK GRADIENT: 8.67 MMHG
AV PEAK VELOCITY-S: 1.47 M/S
BSA FOR ECHO PROCEDURE: 2 M2
BUN SERPL-MCNC: 30 MG/DL (ref 8–20)
CALCIUM SERPL-MCNC: 9.5 MG/DL (ref 8.9–10.3)
CHLORIDE SERPL-SCNC: 98 MMOL/L (ref 98–109)
CO2 SERPL-SCNC: 29 MMOL/L (ref 22–32)
CREAT SERPL-MCNC: 5.2 MG/DL (ref 0.8–1.3)
CUSP SEPARATION: 1.67 CM
E/A RATIO: 1.11
E/E' RATIO: 22.3
E/LAT E' RATIO: 19.3
EDV (BP): 170 ML
EF (A4C): 42 %
EF A2C: 36.9 %
EJECTION FRACTION: 40.3 %
ERYTHROCYTE [DISTWIDTH] IN BLOOD BY AUTOMATED COUNT: 16.5 % (ref 11.6–14.4)
ESV (BP): 101 ML
GFR SERPL CREATININE-BSD FRML MDRD: 10.8 ML/MIN/1.73M*2
GLUCOSE SERPL-MCNC: 100 MG/DL (ref 70–99)
HAV IGM SER QL: NONREACTIVE S/CO
HBV CORE IGM SER QL: NONREACTIVE S/CO
HBV SURFACE AG SER QL: NONREACTIVE S/CO
HBV SURFACE AG SER QL: NONREACTIVE S/CO
HCT VFR BLDCO AUTO: 33.8 % (ref 40–51)
HCV AB SER QL: NONREACTIVE S/CO
HGB BLD-MCNC: 10.9 G/DL (ref 13.7–17.5)
INTERVENTRICULAR SEPTUM: 1.4 CM
LA ESV (BP): 88.8 ML
LA ESV INDEX (A2C): 60 ML/M2
LA ESV INDEX (BP): 44.4 ML/M2
LA/AORTA RATIO: 1.34
LAD 2D - M-MODE: 4.84 CM
LAV-S: 120 ML
LEFT INTERNAL DIMENSION IN SYSTOLE: 5.06 CM (ref 2.87–4.35)
LEFT VENTRICLE DIASTOLIC VOLUME INDEX: 92 ML/M2
LEFT VENTRICLE DIASTOLIC VOLUME: 184 ML
LEFT VENTRICLE SYSTOLIC VOLUME INDEX: 53.5 ML/M2
LEFT VENTRICLE SYSTOLIC VOLUME: 107 ML
LEFT VENTRICULAR INTERNAL DIMENSION IN DIASTOLE: 6.3 CM (ref 4.88–6.78)
LV DIASTOLIC VOLUME: 152 ML
LV ESV (APICAL 2 CHAMBER): 95.9 ML
LVEDVI(A2C): 76 ML/M2
LVEDVI(BP): 85 ML/M2
LVESVI(A2C): 47.95 ML/M2
LVESVI(BP): 50.5 ML/M2
LVOT MG: 1.85 MMHG
LVOT MV: 0.65 M/S
LVOT PEAK VELOCITY: 0.91 M/S
LVOT PG: 3.33 MMHG
LVOT VTI: 17 CM
MAGNESIUM SERPL-MCNC: 2.2 MG/DL (ref 1.8–2.5)
MCH RBC QN AUTO: 31.3 PG (ref 28–33.2)
MCHC RBC AUTO-ENTMCNC: 32.2 G/DL (ref 32.2–36.5)
MCV RBC AUTO: 97.1 FL (ref 83–98)
MR VTI: 146 CM
MV E'TISSUE VEL-LAT: 0.08 M/S
MV E'TISSUE VEL-MED: 0.07 M/S
MV MEAN GRADIENT: 4.77 MMHG
MV PEAK A VEL: 1.35 M/S
MV PEAK E VEL: 1.5 M/S
MV PEAK GRADIENT: 9.4 MMHG
MV VTI: 42.4 CM
PDW BLD AUTO: 11.2 FL (ref 9.4–12.4)
PISA MRMAX VEL: 4.26 M/S
PISA RADIUS: 0.81 CM
PLATELET # BLD AUTO: 91 K/UL (ref 150–350)
POSTERIOR WALL: 1.3 CM
POTASSIUM SERPL-SCNC: 4.3 MMOL/L (ref 3.6–5.1)
RBC # BLD AUTO: 3.48 M/UL (ref 4.5–5.8)
RVOT VMAX: 0.6 M/S
RVOT VTI: 14.2 CM
SODIUM SERPL-SCNC: 137 MMOL/L (ref 136–144)
TR MAX PG: 23 MMHG
TRICUSPID VALVE PEAK REGURGITATION VELOCITY: 2.4 M/S
WBC # BLD AUTO: 4.2 K/UL (ref 3.8–10.5)
Z-SCORE OF LEFT VENTRICULAR DIMENSION IN END DIASTOLE: 0.91
Z-SCORE OF LEFT VENTRICULAR DIMENSION IN END SYSTOLE: 2.84

## 2018-07-10 PROCEDURE — 85027 COMPLETE CBC AUTOMATED: CPT | Performed by: PHYSICIAN ASSISTANT

## 2018-07-10 PROCEDURE — 63700000 HC SELF-ADMINISTRABLE DRUG: Performed by: INTERNAL MEDICINE

## 2018-07-10 PROCEDURE — 80048 BASIC METABOLIC PNL TOTAL CA: CPT | Performed by: PHYSICIAN ASSISTANT

## 2018-07-10 PROCEDURE — 93306 TTE W/DOPPLER COMPLETE: CPT

## 2018-07-10 PROCEDURE — 93306 TTE W/DOPPLER COMPLETE: CPT | Mod: 26 | Performed by: INTERNAL MEDICINE

## 2018-07-10 PROCEDURE — 200200 PR NO CHARGE: Performed by: THORACIC SURGERY (CARDIOTHORACIC VASCULAR SURGERY)

## 2018-07-10 PROCEDURE — 36415 COLL VENOUS BLD VENIPUNCTURE: CPT | Performed by: PHYSICIAN ASSISTANT

## 2018-07-10 PROCEDURE — 63700000 HC SELF-ADMINISTRABLE DRUG: Performed by: PHYSICIAN ASSISTANT

## 2018-07-10 PROCEDURE — 83735 ASSAY OF MAGNESIUM: CPT | Performed by: THORACIC SURGERY (CARDIOTHORACIC VASCULAR SURGERY)

## 2018-07-10 PROCEDURE — 63700000 HC SELF-ADMINISTRABLE DRUG: Performed by: NURSE PRACTITIONER

## 2018-07-10 PROCEDURE — 5A1D70Z PERFORMANCE OF URINARY FILTRATION, INTERMITTENT, LESS THAN 6 HOURS PER DAY: ICD-10-PCS | Performed by: INTERNAL MEDICINE

## 2018-07-10 PROCEDURE — 25000000 HC PHARMACY GENERAL: Performed by: PHYSICIAN ASSISTANT

## 2018-07-10 PROCEDURE — 80074 ACUTE HEPATITIS PANEL: CPT | Performed by: INTERNAL MEDICINE

## 2018-07-10 RX ORDER — CLOPIDOGREL BISULFATE 75 MG/1
75 TABLET ORAL DAILY
Qty: 30 TABLET | Refills: 2 | Status: SHIPPED | OUTPATIENT
Start: 2018-07-11 | End: 2018-08-09 | Stop reason: SDUPTHER

## 2018-07-10 RX ORDER — ASPIRIN 81 MG/1
81 TABLET ORAL DAILY
Status: DISCONTINUED | OUTPATIENT
Start: 2018-07-10 | End: 2018-07-10 | Stop reason: HOSPADM

## 2018-07-10 RX ADMIN — ATORVASTATIN CALCIUM 20 MG: 20 TABLET, FILM COATED ORAL at 08:27

## 2018-07-10 RX ADMIN — MEMANTINE HYDROCHLORIDE 10 MG: 10 TABLET, FILM COATED ORAL at 08:28

## 2018-07-10 RX ADMIN — ASPIRIN 81 MG: 81 TABLET, DELAYED RELEASE ORAL at 14:39

## 2018-07-10 RX ADMIN — ATORVASTATIN CALCIUM 20 MG: 20 TABLET, FILM COATED ORAL at 00:09

## 2018-07-10 RX ADMIN — CLOPIDOGREL BISULFATE 75 MG: 75 TABLET, FILM COATED ORAL at 08:28

## 2018-07-10 RX ADMIN — SEVELAMER CARBONATE 2400 MG: 800 TABLET, FILM COATED ORAL at 14:40

## 2018-07-10 RX ADMIN — Medication 1 TABLET: at 08:27

## 2018-07-10 RX ADMIN — MEMANTINE HYDROCHLORIDE 10 MG: 10 TABLET, FILM COATED ORAL at 00:09

## 2018-07-10 RX ADMIN — Medication 1 TABLET: at 00:10

## 2018-07-10 RX ADMIN — SEVELAMER CARBONATE 2400 MG: 800 TABLET, FILM COATED ORAL at 08:20

## 2018-07-10 RX ADMIN — TAMSULOSIN HYDROCHLORIDE 0.4 MG: 0.4 CAPSULE ORAL at 00:10

## 2018-07-10 RX ADMIN — ACETAMINOPHEN 650 MG: 325 TABLET ORAL at 05:51

## 2018-07-10 RX ADMIN — CARVEDILOL 12.5 MG: 12.5 TABLET, FILM COATED ORAL at 08:23

## 2018-07-10 ASSESSMENT — COGNITIVE AND FUNCTIONAL STATUS - GENERAL
STANDING UP FROM CHAIR USING ARMS: 4 - NONE
HELP NEEDED FOR PERSONAL GROOMING: 3 - A LITTLE
HELP NEEDED FOR BATHING: 3 - A LITTLE
TOILETING: 3 - A LITTLE
DRESSING REGULAR UPPER BODY CLOTHING: 3 - A LITTLE
MOVING TO AND FROM BED TO CHAIR: 3 - A LITTLE
CLIMB 3 TO 5 STEPS WITH RAILING: 3 - A LITTLE
EATING MEALS: 4 - NONE
WALKING IN HOSPITAL ROOM: 4 - NONE
DRESSING REGULAR LOWER BODY CLOTHING: 3 - A LITTLE

## 2018-07-10 NOTE — DISCHARGE SUMMARY
Cardiac Surgery Discharge Summary    BRIEF OVERVIEW  Admitting Provider:  H&P Notes 6/10/2018 to 7/10/2018     Date of Service Author Author Type Status Note Type File Time    06/28/18 1045 SABINA Oswald Nurse Practitioner Signed H&P 06/28/18 1311          Attending Provider: Asa Garces MD Attending phys phone: [phone]  Primary Care Physician at Discharge: Alexandra Ro [phone]    Admission Date: 7/9/2018     Discharge Date: 7/10/2018    Primary Discharge Diagnosis  Mitral valve insufficiency    Secondary Discharge Diagnosis  Active Hospital Problems    Diagnosis Date Noted   • Mitral valve insufficiency 06/14/2018   • Mitral regurgitation 06/11/2018      Resolved Hospital Problems    Diagnosis Date Noted Date Resolved   No resolved problems to display.       DETAILS OF HOSPITAL STAY    Operative Procedures Performed  Procedure(s):  Percutaneous mitral valve repair - additional  TMVR - TRANSAPICAL MITRAL VALVE REPLACEMENT    Consults:   Nephrology      Consult Orders During Admission:  IP CONSULT TO NEPHROLOGY  IP CONSULT TO NEPHROLOGY     Procedures: Ultrasound guided right femoral venous access. Transseptal puncture. Placement of mitral clip XTr device X 1  Pertinent Test Results:   CBC Results       07/10/18 07/09/18 07/09/18                    0559 2202 1131         WBC 4.20 4.77 4.94         RBC 3.48 (L) 3.39 (L) 3.58 (L)         HGB 10.9 (L) 10.8 (L) 11.5 (L)         HCT 33.8 (L) 33.2 (L) 35.1 (L)         MCV 97.1 97.9 98.0         MCH 31.3 31.9 32.1         MCHC 32.2 32.5 32.8         PLT 91 (L) 90 (L) 104 (L)         Comment for PLT at 2202 on 07/09/18:  CONSISTENT WITH PREVIOUS RESULTS        BMP Results       07/10/18 07/09/18 07/09/18                    0559 2202 1131          135 (L) 136         K 4.3 5.0 5.0         Cl 98 98 96 (L)         CO2 29 25 29         Glucose 100 (H) 152 (H) 88         BUN 30 (H) 49 (H) 44 (H)         Creatinine 5.2 (HH) 6.7 (HH) 6.8 (HH)    "      Calcium 9.5 9.6 10.2         Anion Gap 10 12 11         EGFR 10.8 (L) 8.0 (L) 7.9 (L)         Comment for Creatinine at 0559 on 07/10/18:  No visible hemolysis      Comment for Creatinine at 2202 on 07/09/18:  Consistent with previous results              Imaging  Ct Angiogram Chest With And Without Iv Contrast  Result Date: 6/28/2018  IMPRESSION: No acute aortic pathology.  Vascular measurements for operative planning as described.    X-ray Chest 1 View  Result Date: 7/10/2018  IMPRESSION: Cardiomegaly.     Ultrasound Carotid Bilateral  Result Date: 6/28/2018  IMPRESSION: No hemodynamically significant stenosis of the internal carotid arteries. -------------------------------------------------------------------------------- ----- Carotid diameter stenosis criteria (Determination of the diameter stenosis category by velocity correlates with NASCET criteria): 1. Normal - no plaque, normal velocities 2. < 50% diameter stenosis - mild plaque, peak systolic velocity < 135 cm/s. 3. 50 - 69% diameter stenosis - peak systolic velocity > 135 cm/s. 4. 70 - 99% diameter stenosis - end-diastolic velocity > 115 cm/s. 5. Near occlusion - very abnormal waveform. 6. Occlusion. Note that duplex Doppler carotid ultrasound measures peak flow velocity at a stenosis, which is then correlated through a nomogram to a percent stenosis by NASCET criteria for a \"average\" vessel. (Buddy et al.: Carotid Artery Stenosis: Grayscale and Doppler US Diagnosis, Society of Radiologists and Ultrasound Consensus Conference. Radiology, 2003 229: 340-346.)    Cta Abd And Pelvis With And Without Con  Result Date: 6/28/2018  IMPRESSION: No acute aortic pathology.  Vascular measurements for operative planning as described.      Presenting Problem/History of Present Illness  Mitral regurgitation   78 y.o. male seen today for surgical evaluation of his mitral regurgitation. He was evaluated by [facility] and they declined to do surgery. He speaks Japanese " only, he is accompanied by his sister who is assisting with communication. He has symptoms of SOB and dizzy with steps and any activity.  His symptoms are relieved with rest for approximately  2-3 minutes. His symptoms were first noted about a year ago and has been progressive worse since then. He has associated symptoms of edema. He rates his symptoms as moderate.  Pertinent negatives include lightheadedness, syncope, chest pain, palpitations, or fatigue. 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 He has not been hospitalized for CHF in the last year    Exam on Day of Discharge  Patient seen and examined on day of discharge.  Physical Exam   Constitutional: He is oriented to person, place, and time. He appears well-developed and well-nourished.   HENT:   Head: Normocephalic and atraumatic.   Eyes: Conjunctivae are normal. Pupils are equal, round, and reactive to light.   Neck: Normal range of motion. Neck supple.   Cardiovascular: Normal rate, regular rhythm and normal heart sounds.    Left UE AV fistula   Pulmonary/Chest: Effort normal and breath sounds normal.   Abdominal: Soft. Bowel sounds are normal.   Musculoskeletal: Normal range of motion.   Neurological: He is alert and oriented to person, place, and time.   Skin: Skin is warm and dry.   Psychiatric: He has a normal mood and affect. His behavior is normal.   Incisions: Right groin with suture c/d/i    Hospital Course  78 year old male with PMH as above. Patient arrived to [facility] for scheduled Letitia clip procedure. He was taken to the OR on 7/9/18. Post operative course was uncomplicated and patient was taken directly to the step-down unit. Patient received 2 hours of hemodialysis on the night of POD #0. Patient was seen by Nephrology in the am of POD #1 and secondary to his weight being up from admission, he was sent for hemodialysis on day of discharge. Patient is hemodynamically stable on day of discharge. Sister at  bedside to assist with translation. Plavix script e-scribed to pharmacy of patient's choice. Patient will follow up in the office as scheduled for suture removal and ECHO.     Labs  Lab Results   Component Value Date    WBC 4.20 07/10/2018    HGB 10.9 (L) 07/10/2018    HCT 33.8 (L) 07/10/2018    PLT 91 (L) 07/10/2018    ALT 19 06/28/2018    AST 22 06/28/2018     07/10/2018    K 4.3 07/10/2018    CL 98 07/10/2018    CREATININE 5.2 (HH) 07/10/2018    BUN 30 (H) 07/10/2018    CO2 29 07/10/2018    INR 1.3 07/09/2018          Discharge Orders  Released Discharge Orders     Order Details Provider Status    aspirin 81 mg enteric coated tablet Take 81 mg by mouth daily. SABINA Pinon Resume at Discharge (Patient Reported)    atorvastatin (LIPITOR) 20 mg tablet Take 20 mg by mouth daily. SABINA Pinon Resume at Discharge (Patient Reported)    B complex with C#20-folic acid 1 mg capsule Take by mouth daily. SABINA Pinon Resume at Discharge (Patient Reported)    carvedilol (COREG) 12.5 mg tablet Take 12.5 mg by mouth 2 (two) times a day with meals. SABINA Pinon Resume at Discharge (Patient Reported)    losartan (COZAAR) 25 mg tablet Take 25 mg by mouth 2 (two) times a day. SABINA Pinon Do Not Resume at Discharge    memantine (NAMENDA) 10 mg tablet Take 10 mg by mouth 2 (two) times a day. SABINA Pinon Resume at Discharge (Patient Reported)    sevelamer (RENVELA) 800 mg tablet Take 800 mg by mouth 3 (three) times a day with meals. SABINA Pinon Resume at Discharge (Patient Reported)    tamsulosin (FLOMAX) 0.4 mg capsule Take 0.4 mg by mouth nightly. SABINA Pinon Resume at Discharge (Patient Reported)    acetaminophen (TYLENOL) tablet 650 mg 650 mg, oral, Every 4 hours PRN, pain, fever, oral temp > 100.4°F or rectal temp > 101.4°F, Starting Mon 7/9/18 at 1902, For 90 days, Recovery (CV) and FloorMay be given with NSAIDs/opioids.  Max acetaminophen 3000 mg/day Jaci KERR  SABINA Hernandez Do Not Order at Discharge    acetaminophen (TYLENOL) tablet 650 mg 650 mg, oral, Every 6 hours PRN, pain, Starting Mon 7/9/18 at 1739, For 90 days SABINA Pinon Do Not Order at Discharge    alum-mag hydroxide-simeth (MAALOX) 200-200-20 mg/5 mL suspension 30 mL 30 mL, oral, Every 4 hours PRN, indigestion, Starting Mon 7/9/18 at 1902, For 90 days, Recovery (CV) and Floor** Shake well before administration ** ASBINA Pinon Do Not Order at Discharge    aspirin enteric coated tablet 81 mg 81 mg, oral, Daily, First dose on Tue 7/10/18 at 1230, For 89 doses, Recovery (CV) and Floor** Do not crush, chew, or ibarra **  SABINA Pinon Do Not Order at Discharge    atorvastatin (LIPITOR) tablet 20 mg 20 mg, oral, Daily, First dose on Mon 7/9/18 at 2000, PACU & Post-op Floor SABINA Pinon Do Not Order at Discharge    atropine injection 0.5 mg 0.5 mg, intravenous, Every 5 min PRN, bradycardia, symptomatic bradycardia and notify MD, Starting Mon 7/9/18 at 1902, For 4 doses, Recovery (CV) and FloorMax 3 mg SABINA Pinon Do Not Order at Discharge    carvedilol (COREG) tablet 12.5 mg 12.5 mg, oral, 2 times daily with meals, First dose on Tue 7/10/18 at 0800, PACU & Post-op FloorHold if SBP less than 100; HR less than 65 ** TAKE WITH FOOD ** SABINA Pinon Do Not Order at Discharge    ceFAZolin in sterile water (ANCEF) injection 2 g 2 g, intravenous, Administer over 5 Minutes, 60 min pre-op, Mon 7/9/18 at 1745, For 1 doseDo not give to PORT access patients.Indications: Prevention of Perioperative Infection SABINA Pinon Do Not Order at Discharge    chlorhexidine (PERIDEX) 0.12 % mouthwash 15 mL 15 mL, Swish & Spit, Once, Mon 7/9/18 at 1745, For 1 dose SABINA Pinon Do Not Order at Discharge    clopidogrel (PLAVIX) 75 mg tablet Take 1 tablet (75 mg total) by mouth daily. SABINA Pnion Prescribed    memantine (NAMENDA) tablet 10 mg 10 mg, oral, 2 times daily, First dose on  Mon 7/9/18 at 2000, PACU & Post-op Floor SABINA Pinon Do Not Order at Discharge    ondansetron (ZOFRAN) injection 4 mg 4 mg, intravenous, Every 8 hours PRN, nausea, vomiting, Nausea/Vomiting, Starting Mon 7/9/18 at 1902, For 90 days, Recovery (CV) and FloorIf unable to take orally. SABINA Pinon Do Not Order at Discharge    ondansetron ODT (ZOFRAN-ODT) disintegrating tablet 4 mg 4 mg, oral, Every 8 hours PRN, nausea, vomiting, Nausea/Vomiting, Starting Mon 7/9/18 at 1902, For 90 days, Recovery (CV) and Floor SABINA Pinon Do Not Order at Discharge    renal multivitamin tablet 1 tablet 1 tablet, oral, Daily, First dose on Mon 7/9/18 at 2000, PACU & Post-op Floor SABINA Pinon Do Not Order at Discharge    sevelamer (RENVELA) tablet 2,400 mg 2,400 mg, oral, 3 times daily with meals, First dose on Tue 7/10/18 at 0800, For 267 dosesHold while NPO The  recommends  oral doses 1 hour prior or 3 hours after the Sevelamer (renagel) dose for medications with a narrow therapeutic window or known interactions (see list below). The  also recommends against tube feeding administration.  List of drugs include: Carbamazepine, Ciprofloxacin, Clindamycin, Clonidine, Digoxin, Levofloxacin, Levothyroxine, Lithium, Minoxidil, Phenytoin, Mycophenolate Mofetil, Prazosin, Primidone, Quinidine, Theophylline, Valproic acid/divalproex, warfarin   SABINA Pinon Do Not Order at Discharge    tamsulosin (FLOMAX) 24 hr ER capsule 0.4 mg 0.4 mg, oral, Nightly, First dose on Mon 7/9/18 at 2200, PACU & Post-op Floor** Do not crush, chew, or ibarra **  SABINA Pinon Do Not Order at Discharge    Follow-up with Provider: Referral By ARIN SWANSON 1 visitInstructions for follow-up: Suture removal 7/16 @ 12pm, follow up echo on 8/9 @ 2pm in the [facility]. Follow up appointment 8/9 with Dr Garces on 8/9 @ 3:15pm in the [facility], section  "\"E\". SABINA Pinon New at Discharge    Follow-up with primary care provider Instructions for follow-up: Follow up with your PCP in 2-4 weeks. Please call for an appt. SABINA Pinon New at Discharge          Outpatient Follow-Ups  Future Appointments  Date Time Provider Department Center   7/16/2018 12:00 PM [facility] PA [id number] None   8/9/2018 2:00 PM [facility] ECHO 3 [id number]   8/9/2018 3:15 PM [facility]     Referrals:  No orders of the defined types were placed in this encounter.      Problem List  * Mitral valve insufficiency   Assessment & Plan    POD #1 anthony clip   Postop ECHO to be read prior to dc  Continue with aspirin, plavix, BB and statin  Follow up in office as ordered             Discharge Disposition  Home   Code Status at Discharge: Full Code  Physician Order for Life-Sustaining Treatment Document Status      No documents found                SABINA Castro  7/10/2018  "

## 2018-07-10 NOTE — NURSING NOTE
heron transported to [facility] via wheel chair by PCT Ander for discharge.

## 2018-07-10 NOTE — PLAN OF CARE
Problem: Patient Care Overview  Goal: Plan of Care Review  Outcome: Ongoing (interventions implemented as appropriate)      Problem: Fall Risk (Adult)  Goal: Identify Related Risk Factors and Signs and Symptoms  Outcome: Outcome(s) Achieved Date Met: 07/10/18    Goal: Absence of Falls  Outcome: Ongoing (interventions implemented as appropriate)

## 2018-07-10 NOTE — PLAN OF CARE
Problem: Patient Care Overview  Goal: Discharge Needs Assessment  Outcome: Ongoing (interventions implemented as appropriate)  Chart reviewed and discussed in CPR. Patient is POD #1 Letitia Clip. For d/c today. Patient receiving dialysis at this time. Met with wife. Patient lives with supportive wife who will assist with needs at home. PTA, independent with ADLs. No DME at home. Has Rx coverage. Attends [facility] M-W-F. Takes community para transit on Mondays and wife drives the other days.Anticipate d/c to home without services.    07/10/18 1248   DC Needs Assessment   Concerns To Be Addressed no discharge needs identified;denies needs/concerns at this time   Readmission Within The Last 30 Days no previous admission in last 30 days   Anticipated Discharge Disposition home without services   Equipment Needed After Discharge none   Current Health   Anticipated Changes Related to Illness none   Activity/Self Care ROS   Equipment Currently Used at Home none

## 2018-07-10 NOTE — DISCHARGE INSTRUCTIONS
Discharge Instructions [facility]  GUIDELINE FOR ACTIVITY     During your first week at home we strongly suggest that you have assistance from family and friends. You can expect to have good days and bad days, so regulate activities according to what your body tells you. Try to balance rest and activity, and when resting remember to keep your legs elevated.       It is important to continue to weigh yourself everyday, just as you were weighed daily in the hospital. Try to weigh yourself every morning after going to the bathroom, using the same scale. A gradual weight gain of 5 pounds in 3 to 5 days, or any increase in puffiness or swelling in fingers and ankles, should be reported to your doctor immediately.       It is also important to shower daily, in order to keep your incisions clean. You should check your incision daily, and report any increased redness, drainage, or a temperature over 101 to your surgeon immediately. A simple task of taking a shower can be very challenging the first week or two after surgery and you may find that you need some assistance. A chair in the shower can be helpful if standing for a long period is too tiring. A simple plastic lawn chair can be placed in the shower, or a small shower bench can be purchased at a local drug store. A handheld shower head could also be helpful to use while sitting in the shower. You may also find that you just need an extra set of hands to help reach your back or wash your hair.           If you have an incision on your chest you should avoid lifting anything more than 5 pounds for 6 weeks. People do not usually realize how much this may affect them. A gallon of milk weighs about 8 pounds.   Also please avoid bending over greater than a 90 degree angle for 6 weeks post op.      RESPIRATORY/BREATHING    For the first week at home, continue to use the Incentive Spirometer that you were using in the hospital. Try to use it 4-5 times each day, doing 10 puffs  each time. Should any unusual shortness of breath occur at any time, especially when lying flat, notify your doctor immediately.       DIET   For the first week at home, eat what you want! We just want to make sure that you eat! It may take a while for your appetite to come back, so maybe six small meals a day will be easier than three large meals. Also try to limit your fluid intake to 6-7 cups per day. Remember to weigh yourself daily and report any sudden weight gain. Avoid high salt content foods.       BOWELS   If you do not have a bowel movement within 2 days of being at home, try some prune juice or an over-the-counter laxative(Milk of Magnesia or Bisacodyl suppositories are the quick answers, Miralax is another option but take a couple of days to work). If there is no bowel movement in the first week, notify your doctor immediately.       MEDICATIONS    At discharge from the hospital, your nurse went over all the medications you are going to take at home. Remember only to take the medication listed on your discharge instructions. DO NOT try to incorporate previous medications into your daily instructed mediations. A home care nurse will help you set up a schedule for taking your medications usually during the 1st home visit. If you elected not to par take in home care, take your medications according to the schedule given to you by your discharging nurse.   You will be discharged on Aspirin and Plavix. You will take both of these for 6 months after your procedure. If for some reason you are not on plavix you may be on another blood thinning medication for another reason. This medication may continue longer then 6 months.       PAIN MANAGEMENT   Please try 2 extra strength Tylenol (acetaminophen) every 6- 8 hrs for incisional discomfort. You will also be sent home with prescription strength pain medication. Use these pain pills when you need them, and if the Tylenol is not helping. Suggestions for use include  medicating- before activity, at bedtime, and then when needed. By the second week you should be able to use just Extra Strength Tylenol for pain. Remember, no driving while taking prescription pain meds!       FAMILY   For the first week at home you should try to limit your visitors. Entertaining even your closest friends and family may tire you out too much. Remember that it is okay to excuse yourself when you are too tired. During the second to third week you can start to increase your number of visitors as tolerated. By the fourth week, enjoy yourself!           It is also important to mention that sometimes following surgery people become depressed. Family and friends should be aware of signs of depression. It is important to watch for changes in appetite, changes in sleep patterns, not wanting to see friends/family, not wanting to participate in daily activities. Any sign of depression should be reported to the doctor.           FOLLOW UP APPOINTMENTS  You will be given an appointment to return to your surgeon’s office 1 month after surgery. This appointment will follow an Echocardiogram appointment at [facility] as well. So go get your echo on the street level of the [facility] then come on up to the [facility] Level “E” for your doctor’s appointment.   You also should be calling your cardiologist for an appointment in 1-2 weeks after your discharge date. This is the doctor who will continue to manage your heart condition after your surgeon sees you in 1 month’s time.              WHEN TO CALL THE DOCTOR   As previously mentioned, call the doctor for:   ~ Temperature over 101   ~ Increased redness or drainage from incisions   ~ Unusual shortness of breath   ~ No bowel movement in the first week   ~ Gradual weight gain of 5 pounds in 3-5 days   ~ Increased puffiness or swelling in fingers or ankles   ~ Pain not relieved by pain pills       FOR ANY QUESTIONS OR CONCERNS,    CALL SURGEON'S OFFICE: [phone]  Ask for Jasmina Jacobs, Nurse Practitioner

## 2018-07-10 NOTE — ASSESSMENT & PLAN NOTE
POD #1 anthony clip   Postop ECHO to be read prior to dc  Continue with aspirin, plavix, BB and statin  Follow up in office as ordered

## 2018-07-10 NOTE — NURSING NOTE
Discharge instructions explained to patient/wife and sister in law. Interpretor services used. Family and patient verbalized understanding of instructions. L neck gauze dressing changed and new dressing re applied. No bleeding noted at dialysis site. Gauze C/D/I

## 2018-07-11 ENCOUNTER — TELEPHONE (OUTPATIENT)
Dept: SCHEDULING | Facility: CLINIC | Age: 78
End: 2018-07-11

## 2018-07-11 LAB
ATRIAL RATE: 69
ATRIAL RATE: 69
P AXIS: 89
P AXIS: 89
PR INTERVAL: 220
PR INTERVAL: 224
QRS DURATION: 164
QRS DURATION: 168
QT INTERVAL: 500
QT INTERVAL: 506
QTC CALCULATION(BAZETT): 535
QTC CALCULATION(BAZETT): 542
R AXIS: -15
R AXIS: 8
T WAVE AXIS: 21
T WAVE AXIS: 28
VENTRICULAR RATE: 69
VENTRICULAR RATE: 69

## 2018-07-11 PROCEDURE — 93010 ELECTROCARDIOGRAM REPORT: CPT | Mod: 76 | Performed by: INTERNAL MEDICINE

## 2018-07-11 NOTE — TELEPHONE ENCOUNTER
Pt has an appt on 7/16/2018 at 12:00 for a [facility] CT PA  Pt has dialysis on m-w-f  Karime is requesting to have appt moved to Tuesday,if possible, 10:00 and after

## 2018-07-13 LAB
CROSSMATCH: NORMAL
CROSSMATCH: NORMAL
ISBT CODE: 9500
ISBT CODE: 9500
PRODUCT CODE: NORMAL
PRODUCT CODE: NORMAL
PRODUCT STATUS: NORMAL
PRODUCT STATUS: NORMAL
SPECIMEN EXP DATE BLD: NORMAL
SPECIMEN EXP DATE BLD: NORMAL
UNIT ABO: NORMAL
UNIT ABO: NORMAL
UNIT ID: NORMAL
UNIT ID: NORMAL
UNIT RH: NEGATIVE
UNIT RH: NEGATIVE

## 2018-07-16 LAB
POCT ACT-LR: 169 SEC (ref 113–149)
POCT TEST: ABNORMAL

## 2018-07-17 ENCOUNTER — OFFICE VISIT (OUTPATIENT)
Dept: CARDIOTHORACIC SURGERY | Facility: CLINIC | Age: 78
End: 2018-07-17
Payer: COMMERCIAL

## 2018-07-17 VITALS
HEART RATE: 84 BPM | SYSTOLIC BLOOD PRESSURE: 140 MMHG | RESPIRATION RATE: 14 BRPM | OXYGEN SATURATION: 97 % | DIASTOLIC BLOOD PRESSURE: 68 MMHG | WEIGHT: 179 LBS | HEIGHT: 70 IN | BODY MASS INDEX: 25.62 KG/M2

## 2018-07-17 DIAGNOSIS — I34.0 NON-RHEUMATIC MITRAL REGURGITATION: Primary | ICD-10-CM

## 2018-07-17 PROCEDURE — 99024 POSTOP FOLLOW-UP VISIT: CPT | Performed by: NURSE PRACTITIONER

## 2018-07-17 NOTE — PROGRESS NOTES
"CARDIOVASCULAR SURGERY FOLLOW-UP PROGRESS NOTE    Subjective   Patient is a 78 y.o. male who presents for follow up s/p mitral clip.  He comes in today complaining of occasional mild dizziness and fatigue.  His activity level has been fair.  He had dialysis yesterday, he states his weight yesterday was 79 kg down from 83 kg last week. He feels fatigued today. Pertinent negatives include syncope, chest pain, or palpitations.      Objective   /68 (BP Location: Right upper arm, Patient Position: Sitting)   Pulse 84   Resp 14   Ht 1.778 m (5' 10\")   Wt 81.2 kg (179 lb)   SpO2 97%   BMI 25.68 kg/m²   Heart:  regular rate and rhythm  Lungs:  clear to auscultation bilaterally  Extremities:  +1 pitting edema of LE  Incision(s):  Right groin ecchymotic, no hematoma, black suture in place, no drainage.     Assessment/Plan   S/P mitral valve repair- Mitral clip  No significant post-op complications  Right groin suture removed without difficulty.  Wash area with soap and water and pat dry.  Ongoing medical management with cardiology  Follow up with an echo in 3 weeks with Dr Garces.   "

## 2018-07-18 LAB
POCT ACT-LR: >400 SEC (ref 113–149)
POCT TEST: ABNORMAL

## 2018-07-25 PROBLEM — Z95.818 S/P MITRAL VALVE CLIP IMPLANTATION: Status: ACTIVE | Noted: 2018-07-25

## 2018-07-25 PROBLEM — Z98.890 S/P MITRAL VALVE CLIP IMPLANTATION: Status: ACTIVE | Noted: 2018-07-25

## 2018-08-08 NOTE — PROGRESS NOTES
"CARDIOVASCULAR SURGERY FOLLOW-UP PROGRESS NOTE    Subjective   Patient is a 78 y.o. male who presents for follow up s/p mitral valve repair with a single Mitral Clip placement.  He comes in today complaining of fatigue.  His activity level has been fair. He has a very poor appetite.       Objective   /76 (BP Location: Left upper arm, Patient Position: Sitting)   Pulse 75   Resp 12   Ht 1.778 m (5' 10\")   Wt 80.7 kg (178 lb)   SpO2 99%   BMI 25.54 kg/m²   Heart:  regular rate and rhythm, regular rhythm  Lungs:  rales bilaterally  Extremities:  lower extremity edema bilaterally    Data Review:  Echocardiogram:  Less mitral regurgitation when compared to pre op.  Mild mitral valve stenosis, moderate residual mitral regurgitation with severe tricuspid regurgitation. He is mildly improved symptomatic from preoperatively.  Clip is intact.      Assessment/Plan   S/P mitral valve repair with the placement of a single mitral clip  No significant post-op complications.  He remains volume overloaded with severe tricuspid regurgitation, LE edema SAUL.  He is ESRD and anuric.  Would recommend increasing his dialysis days for several weeks to get adequate control of his volume overload.    Dental prophylaxis reiterated  Ongoing medical management with cardiology.  No physical limitations or restrictions at this time.    Continue Plavix for 6 months for Mitral Clip purposes.    Follow-up in 1 year with an echo.        "

## 2018-08-09 ENCOUNTER — HOSPITAL ENCOUNTER (OUTPATIENT)
Dept: CARDIOLOGY | Facility: HOSPITAL | Age: 78
Discharge: HOME | End: 2018-08-09
Attending: NURSE PRACTITIONER
Payer: MEDICARE

## 2018-08-09 ENCOUNTER — OFFICE VISIT (OUTPATIENT)
Dept: CARDIOTHORACIC SURGERY | Facility: CLINIC | Age: 78
End: 2018-08-09
Payer: COMMERCIAL

## 2018-08-09 VITALS
WEIGHT: 178 LBS | OXYGEN SATURATION: 99 % | RESPIRATION RATE: 12 BRPM | DIASTOLIC BLOOD PRESSURE: 76 MMHG | HEIGHT: 70 IN | BODY MASS INDEX: 25.48 KG/M2 | SYSTOLIC BLOOD PRESSURE: 140 MMHG | HEART RATE: 75 BPM

## 2018-08-09 VITALS
BODY MASS INDEX: 25.62 KG/M2 | SYSTOLIC BLOOD PRESSURE: 130 MMHG | WEIGHT: 179 LBS | DIASTOLIC BLOOD PRESSURE: 60 MMHG | HEIGHT: 70 IN

## 2018-08-09 DIAGNOSIS — Z98.890 S/P MITRAL VALVE CLIP IMPLANTATION: Primary | ICD-10-CM

## 2018-08-09 DIAGNOSIS — Z95.818 S/P MITRAL VALVE CLIP IMPLANTATION: Primary | ICD-10-CM

## 2018-08-09 DIAGNOSIS — Z95.2 S/P TAVR (TRANSCATHETER AORTIC VALVE REPLACEMENT): ICD-10-CM

## 2018-08-09 LAB
AORTIC ROOT ANNULUS - M-MODE: 3.8 CM
AORTIC VALVE MEAN VELOCITY: 1.02 M/S
AORTIC VALVE VELOCITY TIME INTEGRAL: 26.2 CM
AV MEAN GRADIENT: 5 MMHG
AV PEAK GRADIENT: 8 MMHG
AV PEAK VELOCITY-S: 1.4 M/S
AV VALVE AREA: 2.24 CM2
BSA FOR ECHO PROCEDURE: 2 M2
CUSP SEPARATION: 1.8 CM
DOP CALC LVOT STROKE VOLUME: 58.78 ML
E WAVE DECELERATION TIME: 174 MS
E/A RATIO: 1.4
E/E' RATIO: 16.3
E/LAT E' RATIO: 18.6
EDV (BP): 203 CM3
EDV (MM-TEICH): 87.7 CM3
EF (A4C): 54 %
EF (MM-TEICH): 53.2 %
EF A2C: 51 %
EJECTION FRACTION: 54 %
ESV (BP): 94 CM3
ESV (MM-TEICH): 41 CM3
LA ESV (BP): 129 CM3
LA ESV INDEX (A2C): 62 CM3/M2
LA ESV INDEX (BP): 64.5 CM3/M2
LA/AORTA RATIO: 1.42
LAAS-AP2: 32 CM2
LAAS-AP4: 33.9 CM2
LAD 2D - M-MODE: 5.4 CM
LALD A4C: 6.87 CM
LALD A4C: 7.53 CM
LAV-S: 124 CM3
LEFT ATRIUM VOLUME INDEX: 62.5 CM3/M2
LEFT ATRIUM VOLUME: 125 CM3
LEFT VENTRICLE DIASTOLIC VOLUME INDEX: 94 CM3/M2
LEFT VENTRICLE DIASTOLIC VOLUME: 188 CM3
LEFT VENTRICLE SYSTOLIC VOLUME INDEX: 43.5 CM3/M2
LEFT VENTRICLE SYSTOLIC VOLUME: 87 CM3
LV DIASTOLIC VOLUME: 206 CM3
LV ESV (APICAL 2 CHAMBER): 101 CM3
LVAD-AP2: 45.5 CM2
LVAD-AP4: 45.1 CM2
LVAS-AP2: 30.2 CM2
LVAS-AP4: 28.6 CM2
LVEDVI(A2C): 103 CM3/M2
LVEDVI(BP): 101.5 CM3/M2
LVESVI(A2C): 50.5 CM3/M2
LVESVI(BP): 47 CM3/M2
LVLD-AP2: 8.42 CM
LVLD-AP4: 9.02 CM
LVLS-AP2: 7.73 CM
LVLS-AP4: 7.86 CM
LVOT 2D: 2.4 CM
LVOT A: 4.52 CM2
LVOT MG: 2 MMHG
LVOT MV: 0.59 M/S
LVOT PEAK VELOCITY: 0.83 M/S
LVOT PG: 3 MMHG
LVOT VTI: 13 CM
M MODE - INTERVENTRICULAR SEPTUM IN END DIASTOLE: 1 CM
M MODE - LEFT INTERNAL DIMENSION IN SYSTOLE: 3.2 CM
M MODE - LEFT VENTRICULAR INTERNAL DIMENSION IN DIASTOLE: 4.4 CM
M MODE - LEFT VENTRICULAR POSTERIOR WALL IN END DIASTOLE: 1 CM
M MODE - LEFT VENTRICULAR POSTERIOR WALL IN END DIASTOLE: 1 CM
MITRAL VALVE MEAN INFLOW VELOCITY: 1.9 M/S
MR DP/DT: 418 MMHG/S
MR VELOCITY: 4.75 M/S
MR VTI: 145 CM
MV D: 5.2 CM
MV E'TISSUE VEL-LAT: 0.08 M/S
MV E'TISSUE VEL-MED: 0.09 M/S
MV MEAN GRADIENT: 8 MMHG
MV PEAK A VEL: 1.1 M/S
MV PEAK E VEL: 1.5 M/S
MV PEAK GRADIENT: 14 MMHG
MV VALVE AREA BY CONTINUITY EQUATION: 1.93 CM2
MV VALVE AREA P 1/2 METHOD: 4.49 CM2
MV VTI: 30.4 CM
PISA RADIUS: 0.9 CM
PULMONARY REGURGITATION LATE DIASTOLIC VELOCITY: 2 M/S
RVOT VMAX: 0.46 M/S
RVOT VTI: 8.36 CM
SEPTAL TISSUE DOPPLER FREE WALL LATE DIA VELOCITY (APICAL 4 CHAMBER VIEW): 9.46 M/S
TR MAX PG: 26 MMHG
TRICUSPID VALVE PEAK REGURGITATION VELOCITY: 2.55 M/S

## 2018-08-09 PROCEDURE — 93306 TTE W/DOPPLER COMPLETE: CPT

## 2018-08-09 PROCEDURE — 93306 TTE W/DOPPLER COMPLETE: CPT | Mod: 26 | Performed by: INTERNAL MEDICINE

## 2018-08-09 PROCEDURE — 99024 POSTOP FOLLOW-UP VISIT: CPT | Performed by: THORACIC SURGERY (CARDIOTHORACIC VASCULAR SURGERY)

## 2018-08-09 RX ORDER — CLOPIDOGREL BISULFATE 75 MG/1
75 TABLET ORAL DAILY
Qty: 30 TABLET | Refills: 4 | Status: SHIPPED | OUTPATIENT
Start: 2018-08-09 | End: 2019-08-13

## 2018-10-02 ENCOUNTER — OFFICE VISIT (OUTPATIENT)
Dept: CARDIOLOGY | Facility: CLINIC | Age: 78
End: 2018-10-02
Payer: COMMERCIAL

## 2018-10-02 VITALS — RESPIRATION RATE: 16 BRPM | BODY MASS INDEX: 25.2 KG/M2 | WEIGHT: 176 LBS | HEART RATE: 72 BPM | HEIGHT: 70 IN

## 2018-10-02 DIAGNOSIS — Q61.3 POLYCYSTIC KIDNEY DISEASE: ICD-10-CM

## 2018-10-02 DIAGNOSIS — I50.42 CHF (CONGESTIVE HEART FAILURE), NYHA CLASS III, CHRONIC, COMBINED (CMS/HCC): ICD-10-CM

## 2018-10-02 DIAGNOSIS — I34.0 NON-RHEUMATIC MITRAL REGURGITATION: ICD-10-CM

## 2018-10-02 DIAGNOSIS — Z95.818 S/P MITRAL VALVE CLIP IMPLANTATION: ICD-10-CM

## 2018-10-02 DIAGNOSIS — N18.6 END STAGE RENAL DISEASE ON DIALYSIS (CMS/HCC): ICD-10-CM

## 2018-10-02 DIAGNOSIS — Z98.890 S/P MITRAL VALVE CLIP IMPLANTATION: ICD-10-CM

## 2018-10-02 DIAGNOSIS — Z95.810 BIVENTRICULAR IMPLANTABLE CARDIOVERTER-DEFIBRILLATOR (ICD) IN SITU: ICD-10-CM

## 2018-10-02 DIAGNOSIS — Z98.61 HISTORY OF PERCUTANEOUS CORONARY INTERVENTION: ICD-10-CM

## 2018-10-02 DIAGNOSIS — I42.0 DILATED CARDIOMYOPATHY (CMS/HCC): ICD-10-CM

## 2018-10-02 DIAGNOSIS — Z99.2 END STAGE RENAL DISEASE ON DIALYSIS (CMS/HCC): ICD-10-CM

## 2018-10-02 DIAGNOSIS — E78.2 MIXED HYPERLIPIDEMIA: ICD-10-CM

## 2018-10-02 DIAGNOSIS — I44.7 LBBB (LEFT BUNDLE BRANCH BLOCK): ICD-10-CM

## 2018-10-02 DIAGNOSIS — I10 ESSENTIAL HYPERTENSION: Primary | ICD-10-CM

## 2018-10-02 RX ORDER — CARVEDILOL 25 MG/1
25 TABLET ORAL 2 TIMES DAILY WITH MEALS
Qty: 180 TABLET | Refills: 0 | Status: SHIPPED | OUTPATIENT
Start: 2018-10-02 | End: 2019-08-13

## 2018-10-09 NOTE — PROGRESS NOTES
I performed a history and physical examination of the patient and discussed the management with the Resident. I reviewed the Resident's note and agree with the documented findings and plan of care, except for my comments below or within the additional notes today.    Caio Mcdonald, DO

## 2019-06-10 ENCOUNTER — TELEPHONE (OUTPATIENT)
Dept: CARDIOTHORACIC SURGERY | Facility: CLINIC | Age: 79
End: 2019-06-10

## 2019-06-10 NOTE — TELEPHONE ENCOUNTER
Can to to Dr. Mcdonald's schedule; 1 yr s/p MitraClip. Will need echo and 6 minute walk test prior

## 2019-08-13 ENCOUNTER — HOSPITAL ENCOUNTER (OUTPATIENT)
Dept: CARDIOLOGY | Facility: HOSPITAL | Age: 79
Discharge: HOME | End: 2019-08-13
Attending: NURSE PRACTITIONER
Payer: MEDICARE

## 2019-08-13 ENCOUNTER — OFFICE VISIT (OUTPATIENT)
Dept: CARDIOLOGY | Facility: CLINIC | Age: 79
End: 2019-08-13
Payer: MEDICARE

## 2019-08-13 VITALS — RESPIRATION RATE: 16 BRPM | WEIGHT: 173 LBS | BODY MASS INDEX: 24.77 KG/M2 | HEIGHT: 70 IN | HEART RATE: 70 BPM

## 2019-08-13 DIAGNOSIS — Z95.818 S/P MITRAL VALVE CLIP IMPLANTATION: ICD-10-CM

## 2019-08-13 DIAGNOSIS — I34.0 NON-RHEUMATIC MITRAL REGURGITATION: ICD-10-CM

## 2019-08-13 DIAGNOSIS — Z98.890 S/P MITRAL VALVE CLIP IMPLANTATION: ICD-10-CM

## 2019-08-13 DIAGNOSIS — I25.10 CORONARY ARTERY DISEASE WITHOUT ANGINA PECTORIS, UNSPECIFIED VESSEL OR LESION TYPE, UNSPECIFIED WHETHER NATIVE OR TRANSPLANTED HEART: Primary | ICD-10-CM

## 2019-08-13 LAB
AORTIC ROOT ANNULUS - M-MODE: 3.7 CM
AORTIC VALVE VELOCITY TIME INTEGRAL: 38 CM
AV MEAN GRADIENT: 7 MMHG
AV PEAK GRADIENT: 13 MMHG
AV PEAK VELOCITY-S: 1.78 M/S
BSA FOR ECHO PROCEDURE: 1.98 M2
CUSP SEPARATION: 1.5 CM
DOP CALC LVOT STROKE VOLUME: 78.07 ML
E WAVE DECELERATION TIME: 187 MS
E/A RATIO: 1.3
E/E' RATIO: 20.8
E/LAT E' RATIO: 20.2
ECHO EF ESTIMATED: 45 %
EDV (BP): 259 CM3
EF (A4C): 46 %
EF A2C: 41 %
EJECTION FRACTION: 44 %
EST RIGHT VENT SYSTOLIC PRESSURE BY TRICUSPID REGURGITATION JET: 50 MMHG
ESV (BP): 145 CM3
FRACTIONAL SHORTENING: 37 %
HEART RATE: 69 BPM
INTERVENTRICULAR SEPTUM: 1.4 CM
LA ESV (BP): 71 CM3
LA ESV INDEX (A2C): 50.51 CM3/M2
LA ESV INDEX (BP): 35.86 CM3/M2
LA/AORTA RATIO: 1.49
LAAS-AP2: 27.9 CM2
LAAS-AP4: 16.3 CM2
LAD 2D - M-MODE: 5.5 CM
LAD 2D: 5.3 CM
LALD A4C: 5.24 CM
LALD A4C: 6.46 CM
LAV-S: 100 CM3
LEFT ATRIAL AREA: 27.9 CM2
LEFT ATRIUM VOLUME INDEX: 35.86 CM3/M2
LEFT ATRIUM VOLUME: 71 CM3
LEFT INTERNAL DIMENSION IN SYSTOLE: 3.9 CM
LEFT VENTRICLE DIASTOLIC VOLUME INDEX: 137.88 CM3/M2
LEFT VENTRICLE DIASTOLIC VOLUME: 273 CM3
LEFT VENTRICLE SYSTOLIC VOLUME INDEX: 75.25 CM3/M2
LEFT VENTRICLE SYSTOLIC VOLUME: 149 CM3
LEFT VENTRICULAR INTERNAL DIMENSION IN DIASTOLE: 5.4 CM
LEFT VENTRICULAR POSTERIOR WALL IN END DIASTOLE: 1.3 CM
LV DIASTOLIC VOLUME: 233 CM3
LV ESV (APICAL 2 CHAMBER): 136 CM3
LVAD-AP2: 54.4 CM2
LVAD-AP4: 61.5 CM2
LVAS-AP2: 39.9 CM2
LVAS-AP4: 40 CM2
LVEDVI(A2C): 117.68 CM3/M2
LVEDVI(BP): 130.81 CM3/M2
LVESVI(A2C): 68.69 CM3/M2
LVESVI(BP): 73.23 CM3/M2
LVLD-AP2: 11.1 CM
LVLD-AP4: 11.8 CM
LVLS-AP2: 10.5 CM
LVLS-AP4: 10 CM
LVOT 2D: 2.3 CM
LVOT MG: 2 MMHG
LVOT MV: 0.62 M/S
LVOT PEAK VELOCITY: 1.04 M/S
LVOT PG: 4 MMHG
LVOT VTI: 18.8 CM
MITRAL VALVE MEAN INFLOW VELOCITY: 3.27 M/S
MR VELOCITY: 4.27 M/S
MV E'TISSUE VEL-LAT: 0.07 M/S
MV E'TISSUE VEL-MED: 0.07 M/S
MV MEAN GRADIENT: 6 MMHG
MV PEAK A VEL: 1.04 M/S
MV PEAK E VEL: 1.38 M/S
MV VTI: 46.4 CM
POSTERIOR WALL: 1.3 CM
PV PEAK D VEL: 0.61 M/S
PV PEAK S VEL: 0.85 M/S
RAP: 3 MMHG
RVOT VMAX: 0.67 M/S
RVOT VTI: 14 CM
SEPTAL TISSUE DOPPLER FREE WALL LATE DIA VELOCITY (APICAL 4 CHAMBER VIEW): 0.11 M/S
TR MAX PG: 47 MMHG
TRICUSPID VALVE PEAK REGURGITATION VELOCITY: 3.42 M/S

## 2019-08-13 PROCEDURE — 99214 OFFICE O/P EST MOD 30 MIN: CPT | Performed by: INTERNAL MEDICINE

## 2019-08-13 PROCEDURE — 93000 ELECTROCARDIOGRAM COMPLETE: CPT | Performed by: INTERNAL MEDICINE

## 2019-08-13 PROCEDURE — 93306 TTE W/DOPPLER COMPLETE: CPT | Mod: 26 | Performed by: INTERNAL MEDICINE

## 2019-08-13 PROCEDURE — 93306 TTE W/DOPPLER COMPLETE: CPT

## 2019-08-13 NOTE — LETTER
August 13, 2019     Alexandra Ro MD  [address] [id number]    Patient: Judson Kincaid  YOB: 1940  Date of Visit: 8/13/2019      Dear Dr. Ro:    Thank you for referring Judson Kincaid to me for evaluation. Below are my notes for this consultation.    If you have questions, please do not hesitate to call me. I look forward to following your patient along with you.         Sincerely,        Caio Mcdonald, DO        CC: Julio Huang MD

## 2019-08-13 NOTE — PROGRESS NOTES
"   Cardiology Note       Reason for visit:   Chief Complaint   Patient presents with   • Hypertension      HPI   Judson Kincaid is a 79 y.o. male who presents for  S/P clip 2018  Read as Moderate MR today Dr Wadsworth   systolic doppler right arm        Past Medical History:   Diagnosis Date   • Anemia     in past   • Biventricular implantable cardioverter-defibrillator (ICD) in situ 6/11/2018   • Chest pain on exertion     \"pressure\"   • Chronic renal failure, stage 5 (CMS/[facility]ly [facility]) ([facility]ly [facility]) 6/11/2018   • Coronary artery disease involving native coronary artery of native heart without angina pectoris 6/11/2018    ALESSIA to LAD   • Dependence on hemodialysis (CMS/[facility]ly [facility]) (Formerly [facility]) 06/11/2018 Mon-Wed-Friday HD   • Dilated cardiomyopathy (CMS/[facility]) ([facility]) 6/11/2018   • End stage renal disease on dialysis (CMS/[facility]) ([facility]) 6/11/2018   • Gallstones    • History of appendectomy 6/11/2018    With peritonitis   • History of kidney transplant 6/11/2018   • History of percutaneous coronary intervention 6/11/2018    ALESSIA to LAD   • HTN (hypertension) 6/11/2018   • Hyperlipidemia 6/11/2018   • LBBB (left bundle branch block) 6/11/2018   • Mitral regurgitation 6/11/2018   • Neck pain    • Neuropathy of both feet    • LANNY (obstructive sleep apnea) 6/11/2018    does not wear CPAP   • Polycystic kidney disease 6/11/2018   • Prostate cancer (CMS/[facility]) ([facility])     s/p brachytherapy   • Renal failure    • S/P mitral valve clip implantation 7/25/2018    MitraClip x 1 on 7-   • Status post nephrectomy 6/11/2018    Bilateral     Past Surgical History:   Procedure Laterality Date   • APPENDECTOMY     • AV FISTULA PLACEMENT Left    • CARDIAC CATHETERIZATION      w stent   • CATARACT EXTRACTION W/  INTRAOCULAR LENS IMPLANT     • HERNIA REPAIR     • KIDNEY TRANSPLANT  2010     Social History     Social History   • Marital status:      Spouse name: N/A   • Number of children: 4   • Years of education: N/A     Occupational History   • retired   "     Social History Main Topics   • Smoking status: Never Smoker   • Smokeless tobacco: Never Used   • Alcohol use No   • Drug use: No   • Sexual activity: Not Asked     Other Topics Concern   • None     Social History Narrative    Patient  lives with his wife in a 2 story home     Family History   Problem Relation Age of Onset   • Heart disease Mother    • Heart attack Mother    • Polycystic kidney disease Mother    • Hypertension Mother    • Stroke Father    • Hypertension Father    • Polycystic kidney disease Sister    • Hypertension Sister    • Polycystic kidney disease Brother    • Hypertension Brother      Patient has no known allergies.  Current Outpatient Prescriptions   Medication Sig Dispense Refill   • aspirin 81 mg enteric coated tablet Take 81 mg by mouth daily.     • atorvastatin (LIPITOR) 20 mg tablet Take 20 mg by mouth daily.     • B complex with C#20-folic acid 1 mg capsule Take by mouth daily.     • carvedilol (COREG) 12.5 mg tablet Take 12.5 mg by mouth 2 (two) times a day with meals.     • carvedilol (COREG) 25 mg tablet Take 1 tablet (25 mg total) by mouth 2 (two) times a day with meals. 180 tablet 0   • clopidogrel (PLAVIX) 75 mg tablet Take 1 tablet (75 mg total) by mouth daily. 30 tablet 4   • memantine (NAMENDA) 10 mg tablet Take 10 mg by mouth 2 (two) times a day.     • sevelamer (RENVELA) 800 mg tablet Take 800 mg by mouth 3 (three) times a day with meals.     • tamsulosin (FLOMAX) 0.4 mg capsule Take 0.4 mg by mouth nightly.       No current facility-administered medications for this visit.        ROS  Objective   Vitals:    08/13/19 0923   Pulse: 70   Resp: 16       Physical Exam:    General Appearance:  Alert, no distress   Head:  Normocephalic, without obvious abnormality, atraumatic   Eyes:  Conjunctiva/corneas clear, EOM's intact   Neck: No thyroid enlargement. No JVD. No bruits    Lungs:   Clear to auscultation bilaterally, respirations unlabored, no rales, no wheezing   Heart:   Regular rhythm, S1 and S2 normal,+murmur, rub or gallop   Abdomen:   Soft, non-tender, no masses, no organomegaly   Vascular: Pulses 2+ and symmetric all extremities, no carotid bruit or jugular vein distention   Musculoskeletal:  Skin: No injury or deformity  Skin color, texture, turgor normal, no rashes or lesions, no cyanosis or edema   Extremities: Extremities normal, atraumatic, pulses normal   Behavior/Emotional: Appropriate, cooperative       Lab Results   Component Value Date    WBC 4.20 07/10/2018    HGB 10.9 (L) 07/10/2018    PLT 91 (L) 07/10/2018    ALT 19 06/28/2018    AST 22 06/28/2018     07/10/2018    K 4.3 07/10/2018    CREATININE 5.2 (HH) 07/10/2018    INR 1.3 07/09/2018          ECG   AV PACED B     Mitral valve insufficiency  Overall doing well  Mild SOB Sunday night pre dialysis MWF  Blood pressure post dialysis is 100's pre dialysis Monday maybe 130 with extra volume  Also takes meds after HD not prior  MR read as Moderate with Dr. Wadsworth  6 min walk test plus KQL survey today       Caio Mcdonald,   8/13/2019

## 2019-08-13 NOTE — ASSESSMENT & PLAN NOTE
Overall doing well  Mild SOB Sunday night pre dialysis MWF  Blood pressure post dialysis is 100's pre dialysis Monday maybe 130 with extra volume  Also takes meds after HD not prior  MR read as Moderate with Dr. Wadsworth  6 min walk test plus KQL survey today

## 2019-12-17 ENCOUNTER — TELEPHONE (OUTPATIENT)
Dept: SCHEDULING | Facility: CLINIC | Age: 79
End: 2019-12-17

## 2019-12-17 NOTE — TELEPHONE ENCOUNTER
Called and spoke to Meredith from Dr. Nielson's office. Revealed to her pt has Bi-V ICD (BS) implanted by Dr. Orellana April 2010. Pt was an in patient in 2018 for a Mitral Clip and had ICD checked while in house.    Recommended to Meredith to call  Pt so she can call CCP for specific ICD information since we do not follow this pt.

## 2019-12-17 NOTE — TELEPHONE ENCOUNTER
Dr Nielson [facility] is asking for info cardiac device. Do you see anything in chart indicating as device? Patient having a revision of dialysis graft tomorrow at [facility]. Anesthesia is asking for info. Please call Meredith at [phone] with the information. ty

## 2020-08-10 ENCOUNTER — TELEPHONE (OUTPATIENT)
Dept: SCHEDULING | Facility: CLINIC | Age: 80
End: 2020-08-10

## 2020-10-12 ENCOUNTER — TELEPHONE (OUTPATIENT)
Dept: CARDIOLOGY | Facility: CLINIC | Age: 80
End: 2020-10-12

## 2020-10-13 ENCOUNTER — OFFICE VISIT (OUTPATIENT)
Dept: CARDIOLOGY | Facility: CLINIC | Age: 80
End: 2020-10-13
Payer: MEDICARE

## 2020-10-13 VITALS
RESPIRATION RATE: 16 BRPM | HEIGHT: 70 IN | BODY MASS INDEX: 26.63 KG/M2 | WEIGHT: 186 LBS | DIASTOLIC BLOOD PRESSURE: 60 MMHG | SYSTOLIC BLOOD PRESSURE: 118 MMHG | HEART RATE: 75 BPM

## 2020-10-13 DIAGNOSIS — I25.10 CORONARY ARTERY DISEASE WITHOUT ANGINA PECTORIS, UNSPECIFIED VESSEL OR LESION TYPE, UNSPECIFIED WHETHER NATIVE OR TRANSPLANTED HEART: Primary | ICD-10-CM

## 2020-10-13 PROCEDURE — 99214 OFFICE O/P EST MOD 30 MIN: CPT | Performed by: INTERNAL MEDICINE

## 2020-10-13 PROCEDURE — 93000 ELECTROCARDIOGRAM COMPLETE: CPT | Performed by: INTERNAL MEDICINE

## 2020-10-13 RX ORDER — SACUBITRIL AND VALSARTAN 24; 26 MG/1; MG/1
1 TABLET, FILM COATED ORAL 2 TIMES DAILY
COMMUNITY

## 2020-10-13 NOTE — PROGRESS NOTES
"   Cardiology Note       Reason for visit:   Chief Complaint   Patient presents with   • Coronary Artery Disease      HPI   Judson Kincaid is a 80 y.o. male who presents for  S/P clip 2018  Read as Moderate MR today Dr Wadsworth   systolic doppler right arm    Patient is 80-year-old  gentleman, with history of severe mitral regurgitation-mixed, functional and overriding segments of posterior anterior leaflets along coaptation zone, status post mitral clip XTr device on 7/9/2018, end-stage renal disease on dialysis 3 times a week, coronary artery disease with PCI to LAD, CRT-D, comes for follow-up.    He reports his symptoms have been proved 80 to 90% after his mitral valve clip procedure.  He is able to do most of his activities of daily living without significant symptoms.  Denies any orthopnea, PND, leg swelling.  No chest pain.  He had generator exchange done at [facility] last week.      Last echo August 2019-normal cavity size, moderate LVH, mildly decreased systolic function EF 45%, moderate to severe residual mitral regurgitation, mild mitral valve stenosis, mean gradient 6 at heart rate of 69.        Past Medical History:   Diagnosis Date   • Anemia     in past   • Biventricular implantable cardioverter-defibrillator (ICD) in situ 6/11/2018   • Chest pain on exertion     \"pressure\"   • Chronic renal failure, stage 5 (CMS/[facility]) 6/11/2018   • Coronary artery disease involving native coronary artery of native heart without angina pectoris 6/11/2018    ALESSIA to LAD   • Dependence on hemodialysis (CMS/[facility]) 06/11/2018 Mon-Wed-Friday HD   • Dilated cardiomyopathy (CMS/[facility]) 6/11/2018   • End stage renal disease on dialysis (CMS/[facility]) 6/11/2018   • Gallstones    • History of appendectomy 6/11/2018    With peritonitis   • History of kidney transplant 6/11/2018   • History of percutaneous coronary intervention 6/11/2018    ALESSIA to LAD   • HTN (hypertension) 6/11/2018   • Hyperlipidemia 6/11/2018   • LBBB (left " bundle branch block) 6/11/2018   • Mitral regurgitation 6/11/2018   • Neck pain    • Neuropathy of both feet    • LANNY (obstructive sleep apnea) 6/11/2018    does not wear CPAP   • Polycystic kidney disease 6/11/2018   • Prostate cancer (CMS/HCC)     s/p brachytherapy   • Renal failure    • S/P mitral valve clip implantation 7/25/2018    MitraClip x 1 on 7-   • Status post nephrectomy 6/11/2018    Bilateral     Past Surgical History:   Procedure Laterality Date   • APPENDECTOMY     • AV FISTULA PLACEMENT Left    • CARDIAC CATHETERIZATION      w stent   • CATARACT EXTRACTION W/  INTRAOCULAR LENS IMPLANT     • HERNIA REPAIR     • KIDNEY TRANSPLANT  2010     Social History     Socioeconomic History   • Marital status:      Spouse name: None   • Number of children: 4   • Years of education: None   • Highest education level: None   Occupational History   • Occupation: retired    Social Needs   • Financial resource strain: None   • Food insecurity     Worry: None     Inability: None   • Transportation needs     Medical: None     Non-medical: None   Tobacco Use   • Smoking status: Never Smoker   • Smokeless tobacco: Never Used   Substance and Sexual Activity   • Alcohol use: No   • Drug use: No   • Sexual activity: None   Lifestyle   • Physical activity     Days per week: None     Minutes per session: None   • Stress: None   Relationships   • Social connections     Talks on phone: None     Gets together: None     Attends Sabianism service: None     Active member of club or organization: None     Attends meetings of clubs or organizations: None     Relationship status: None   • Intimate partner violence     Fear of current or ex partner: None     Emotionally abused: None     Physically abused: None     Forced sexual activity: None   Other Topics Concern   • None   Social History Narrative    Patient  lives with his wife in a 2 story home     Family History   Problem Relation Age of Onset   • Heart disease  Biological Mother    • Heart attack Biological Mother    • Polycystic kidney disease Biological Mother    • Hypertension Biological Mother    • Stroke Biological Father    • Hypertension Biological Father    • Polycystic kidney disease Biological Sister    • Hypertension Biological Sister    • Polycystic kidney disease Biological Brother    • Hypertension Biological Brother      Patient has no known allergies.  Current Outpatient Medications   Medication Sig Dispense Refill   • aspirin 81 mg enteric coated tablet Take 81 mg by mouth daily.     • atorvastatin (LIPITOR) 20 mg tablet Take 20 mg by mouth daily.     • B complex with C#20-folic acid 1 mg capsule Take by mouth daily.     • carvedilol (COREG) 12.5 mg tablet Take 12.5 mg by mouth 2 (two) times a day with meals.     • sacubitriL-valsartan (ENTRESTO) 24-26 mg per tablet Take 1 tablet by mouth 2 (two) times a day.     • sevelamer (RENVELA) 800 mg tablet Take 800 mg by mouth 3 (three) times a day with meals.     • tamsulosin (FLOMAX) 0.4 mg capsule Take 0.4 mg by mouth nightly.     • memantine (NAMENDA) 10 mg tablet Take 10 mg by mouth 2 (two) times a day.       No current facility-administered medications for this visit.        ROS  Objective   Vitals:    10/13/20 1345   BP: 118/60   Pulse: 75   Resp: 16       Physical Exam:    General Appearance:  Alert, no distress   Head:  Normocephalic, without obvious abnormality, atraumatic   Eyes:  Conjunctiva/corneas clear, EOM's intact   Neck: No thyroid enlargement. No JVD. No bruits    Lungs:   Clear to auscultation bilaterally, respirations unlabored, no rales, no wheezing   Heart:  Regular rhythm, S1 and S2 normal,+murmur, rub or gallop   Abdomen:   Soft, non-tender, no masses, no organomegaly   Vascular: Pulses 2+ and symmetric all extremities, no carotid bruit or jugular vein distention   Musculoskeletal:  Skin: No injury or deformity  Skin color, texture, turgor normal, no rashes or lesions, no cyanosis or edema    Extremities: Extremities normal, atraumatic, pulses normal   Behavior/Emotional: Appropriate, cooperative       Lab Results   Component Value Date    WBC 4.20 07/10/2018    HGB 10.9 (L) 07/10/2018    PLT 91 (L) 07/10/2018    ALT 19 06/28/2018    AST 22 06/28/2018     07/10/2018    K 4.3 07/10/2018    CREATININE 5.2 (HH) 07/10/2018    INR 1.3 07/09/2018          ECG   AV PACED B     No problem-specific Assessment & Plan notes found for this encounter.    Status post mitral clip  History of severe mitral regurgitation  Moderate to severe residual MR per echo 8/2019.  Doing well symptomatically.  No obvious signs of decompensated heart failure.  CRT-D generator change done at [facility] last week.  Return in 1 year with repeat echocardiogram or earlier if worsening symptoms.         Paris Mills MD  10/13/2020

## 2021-01-28 DIAGNOSIS — Z23 ENCOUNTER FOR IMMUNIZATION: ICD-10-CM

## 2021-02-08 ENCOUNTER — IMMUNIZATIONS (OUTPATIENT)
Dept: FAMILY MEDICINE CLINIC | Facility: HOSPITAL | Age: 81
End: 2021-02-08

## 2021-02-08 DIAGNOSIS — Z23 ENCOUNTER FOR IMMUNIZATION: Primary | ICD-10-CM

## 2021-02-08 PROCEDURE — 91301 SARS-COV-2 / COVID-19 MRNA VACCINE (MODERNA) 100 MCG: CPT

## 2021-02-08 PROCEDURE — 0011A SARS-COV-2 / COVID-19 MRNA VACCINE (MODERNA) 100 MCG: CPT

## 2021-03-10 ENCOUNTER — IMMUNIZATIONS (OUTPATIENT)
Dept: FAMILY MEDICINE CLINIC | Facility: HOSPITAL | Age: 81
End: 2021-03-10

## 2021-03-10 DIAGNOSIS — Z23 ENCOUNTER FOR IMMUNIZATION: Primary | ICD-10-CM

## 2021-03-10 PROCEDURE — 0012A SARS-COV-2 / COVID-19 MRNA VACCINE (MODERNA) 100 MCG: CPT

## 2021-03-10 PROCEDURE — 91301 SARS-COV-2 / COVID-19 MRNA VACCINE (MODERNA) 100 MCG: CPT

## 2022-04-09 NOTE — PROGRESS NOTES
"  Subjective     Patient ID: Judson Kincaid is a 78 y.o. male.    HPI  78-year-old man with a past medical history of nonischemic cardiomyopathy EF 30%, severe mitral regurgitation status post mitral clip earlier this year, severe tricuspid regurgitation, end-stage renal disease due to polycystic kidney disease on dialysis through left upper extremity AV fistula, Bi V ICD presenting for follow-up.    Patient is presenting with a sister who provides most of the history.  He had mitral clip in July of this year, he felt better a month after the procedure, however has been having worsening shortness of breath on minimal exertion since then.  He now gets short of breath walking from one room to another.  He denies any chest tightness on exertion.  He denies any palpitations, no syncope or presyncope.  He reports lower extremity edema which is at baseline.    He saw Dr. Garces August 2018 who recommended intensifying his dialysis schedule.  However this was not done after discussion with nephrology and his cardiologist as per patient's sister.    His cardiologist is Dr. Julio Huang, and he was started on Entresto 49/51 2 weeks ago for heart failure.    Review of Systems   All other systems reviewed and are negative.      Objective     Vitals:    10/02/18 1140   Pulse: 72   Resp: 16   Weight: 79.8 kg (176 lb)   Height: 1.778 m (5' 10\")   /52    Body mass index is 25.25 kg/m².    Physical Exam   Constitutional: He is oriented to person, place, and time. He appears well-developed and well-nourished.   HENT:   Head: Normocephalic and atraumatic.   Eyes: Pupils are equal, round, and reactive to light.   Neck: Normal range of motion.   Cardiovascular: Normal rate, regular rhythm and intact distal pulses.  Exam reveals no gallop and no friction rub.    Murmur heard.  Holosystolic murmur over left sternal border, holosystolic murmur over apex radiating to axilla.   Pulmonary/Chest: Effort normal.   Bibasilar crackles. "  Decreased breath sounds over right lower lobe.   Abdominal: Soft. Bowel sounds are normal.   Musculoskeletal: He exhibits edema.   3+ lower extremity edema (tense).   Neurological: He is alert and oriented to person, place, and time.   Skin: Skin is warm.   Psychiatric: His behavior is normal.       TTE 8/2018:  1. Mild concentric left ventricular hypertrophy with moderately dilated cavity size and mildly decreased ejection fraction. Estimated EF 30%. Global hypokinesis. D-shaped LV cavity consistent with RV volume/pressure overload.  2. Tricuspid aortic valve. Aortic valve and root sclerosis. No significant aortic regurgitation. The proximal ascending aorta measures 3.9 cm.  3. S/P MitraClip x1 (which is intact) with residual moderate mitral regurgitation. There is a mean gradient of 8 mmHg at 81 bpm across the mitral leaflets.   4. Severely dilated left atrium.  5. Moderate to severely dilated right ventricular size with mildly decreased systolic function. Severely dilated right atrium.  Pacemaker/ICD leads visualized in the right-sided cardiac structures.   6. Severe tricuspid regurgitation with estimated RVSP of 46 mmHg.   7. IVC is dilated with <50% respiratory variation consistent with elevated right-sided filling pressures.  8. Grossly normal pulmonic valve leaflets. Dilated main pulmonary artery.  Mild pulmonic regurgitation.  9. No significant pericardial effusion. Multiple hepatic cysts noted.   10. Compared to TTE dated 7/10/2018 the mitral regurgitation is now severe.    Cath: Patent epicardial coronary arteries.    Assessment/Plan   Problem List Items Addressed This Visit     HTN (hypertension) - Primary.  Blood pressure reasonable at this point.  We will continue carvedilol, Entresto    Hyperlipidemia  He is on atorvastatin.    Polycystic kidney disease  End-stage renal disease he is on dialysis.  He does not make any urine.    Dilated cardiomyopathy (CMS/HCC) (HCC)  Ejection fraction 30% on  echocardiogram 8/2018.  Significant mitral regurgitation.  Mean gradient of 8 mmHg at heart rate of 81 across the mitral valve.  -He examines volume overloaded.  Would recommend intensifying dialysis regimen try to bring down his dry weight.  -Will increase coreg to 25mg daily as better heart rate control will help with gradients across mitral valve.    Biventricular implantable cardioverter-defibrillator (ICD) in situ  Atrial sensed biventricular paced rhythm on EKGs.    History of percutaneous coronary intervention    LBBB (left bundle branch block)  He is status post BI V ICD.    End stage renal disease on dialysis (CMS/[facility]) ([facility])    Mitral valve insufficiency  He is status post mitral clip 7/2018.  Plavix was stopped due to excessive bleeding from dialysis fistula access point.    CHF (congestive heart failure), NYHA class III, chronic, combined (CMS/[facility]) ([facility])..  NYHA class III.  He is on carvedilol 12.5 twice daily.  He was started on Entresto by external cardiologist around 2 weeks ago.  -He examines volume overloaded.  Recommend ultrafiltration with dialysis try to bring down his dry weight.    Complication of Procedure: Percutaneous mitral valve repair - additional (07/09/2018)    S/P mitral valve clip implantation  He is status post mitral clip July 2018.  He was on aspirin and Plavix after that however he had excessive bleeding from dialysis fistula axis, and Plavix was stopped by his general cardiologist.    Thrombocytopenia  Noted on CBC.             no immunologic disorder

## 2024-05-21 NOTE — PROGRESS NOTES
Nephrology Hemodialysis Note    Seen on HD for ESRD. Denies CP or SOB. Pre-weight was 2.6 kg above EDW today and so scheduled for additional HD.     Dialyzer type: F160    Td (hours): 3    Dialysate bath: 4K    Qb (mL/min): 400    Qd (mL/min): 800    UF goal (mL): 1800    Assessment/Plan   Tolerating well.  K+= 4.3. Hgb 10.9 g/dl. /55. Will reduce UF goal if BP decreases further. For next HD on Wed 7/11/18.     Ivan Tsai, DO  7/10/2018     Yes

## (undated) DEVICE — CONTROLLER HAND AVANTA

## (undated) DEVICE — SET MULTI PATIENT DISPOSABLE AVANTA

## (undated) DEVICE — CHECK-FLO INTRODUCER 14FRX30 LONG

## (undated) DEVICE — PROTRACK PIGTAIL WIRE

## (undated) DEVICE — RADIAL ARTERY CATH SET 3 FR

## (undated) DEVICE — KIT ARTERIAL LINE

## (undated) DEVICE — TORFLEX TRANSSEPTAL SHEATH

## (undated) DEVICE — KIT CATH LAB ANGIO

## (undated) DEVICE — NRG TRANSSEPTAL NEEDLE EEPROM HIGH FLOW

## (undated) DEVICE — GLIDESHEATH RADIAL 6FR 10CM

## (undated) DEVICE — TRANSDUCER DISP 12IN CABLE